# Patient Record
Sex: FEMALE | Race: WHITE | NOT HISPANIC OR LATINO | Employment: FULL TIME | URBAN - METROPOLITAN AREA
[De-identification: names, ages, dates, MRNs, and addresses within clinical notes are randomized per-mention and may not be internally consistent; named-entity substitution may affect disease eponyms.]

---

## 2017-01-12 RX ORDER — TOPIRAMATE 25 MG/1
TABLET ORAL
Qty: 30 TABLET | Refills: 0 | Status: SHIPPED | OUTPATIENT
Start: 2017-01-12 | End: 2017-01-19 | Stop reason: SDUPTHER

## 2017-01-16 ENCOUNTER — TELEPHONE (OUTPATIENT)
Dept: NEUROLOGY | Facility: CLINIC | Age: 27
End: 2017-01-16

## 2017-01-19 ENCOUNTER — OFFICE VISIT (OUTPATIENT)
Dept: NEUROLOGY | Facility: CLINIC | Age: 27
End: 2017-01-19

## 2017-01-19 VITALS
HEIGHT: 68 IN | BODY MASS INDEX: 39.1 KG/M2 | WEIGHT: 258 LBS | SYSTOLIC BLOOD PRESSURE: 115 MMHG | DIASTOLIC BLOOD PRESSURE: 79 MMHG

## 2017-01-19 DIAGNOSIS — R56.9 CONVULSIONS, UNSPECIFIED CONVULSION TYPE (HCC): Primary | ICD-10-CM

## 2017-01-19 PROCEDURE — 99213 OFFICE O/P EST LOW 20 MIN: CPT | Performed by: PSYCHIATRY & NEUROLOGY

## 2017-01-19 RX ORDER — LEVETIRACETAM 1000 MG/1
1500 TABLET ORAL 2 TIMES DAILY
Qty: 90 TABLET | Refills: 11 | Status: SHIPPED | OUTPATIENT
Start: 2017-01-19 | End: 2017-08-11 | Stop reason: SDUPTHER

## 2017-01-19 RX ORDER — TOPIRAMATE 25 MG/1
25 TABLET ORAL NIGHTLY
Qty: 30 TABLET | Refills: 11 | Status: SHIPPED | OUTPATIENT
Start: 2017-01-19 | End: 2017-08-11 | Stop reason: SDUPTHER

## 2017-01-19 NOTE — LETTER
January 19, 2017     Alejandro Cleary MD  [address] [place] [address] [address]  [place] [address]    Patient: Aubrie Hilton   YOB: 1990   Date of Visit: 1/19/2017       Dear Dr. Evin MD:    Thank you for referring Aubrie Hilton to me for evaluation. Below are the relevant portions of my assessment and plan of care.    If you have questions, please do not hesitate to call me. I look forward to following Aubrie along with you.         Sincerely,        Iker Leger MD        CC: No Recipients  Iker Leger MD  1/19/2017 11:04 AM  Signed  Subjective:     Patient ID: Aubrie Hilton is a 26 y.o. female.    History of Present Illness     The patient has had no seizures since her last visit here in July.  She is compliant with the medicine and not having side effects if anything she has less of a fall on since she has been on the Topamax.  The following portions of the patient's history were reviewed and updated as appropriate: allergies, current medications, past family history, past medical history, past social history, past surgical history and problem list.      Current Outpatient Prescriptions:   •  Etonogestrel (IMPLANON) 68 MG implant subdermal implant, Inject 1 each into the skin 1 (One) Time., Disp: , Rfl:   •  FLUoxetine (PROzac) 20 MG capsule, , Disp: , Rfl:   •  levETIRAcetam (KEPPRA) 1000 MG tablet, Take 1.5 tablets by mouth 2 (Two) Times a Day., Disp: 90 tablet, Rfl: 11  •  spironolactone (ALDACTONE) 100 MG tablet, , Disp: , Rfl:   •  topiramate (TOPAMAX) 25 MG tablet, Take 1 tablet by mouth Every Night., Disp: 30 tablet, Rfl: 11    Review of Systems   Constitutional: Negative.    Neurological: Negative.    Psychiatric/Behavioral: Negative.         Objective:    Neurologic Exam  Mental status examination was appropriate.  Funduscopy, visual fields, eye movements and pupillary reflexes were normal.  No facial weakness was noted.  Gait was normal.  No pattern of focal weakness  was noted.  Physical Exam    Assessment/Plan:     Aubrie was seen today for seizures.    Diagnoses and all orders for this visit:    Convulsions, unspecified convulsion type    Other orders  -     levETIRAcetam (KEPPRA) 1000 MG tablet; Take 1.5 tablets by mouth 2 (Two) Times a Day.  -     topiramate (TOPAMAX) 25 MG tablet; Take 1 tablet by mouth Every Night.       Prescription drug management - Topamax and Keppra as above.      Follow-up in the office in one year.FU in one year  Thank you for allowing me to share in the care of this patient.  Iker Leger M.D.

## 2017-01-19 NOTE — PROGRESS NOTES
Subjective:     Patient ID: Aubrie Hilton is a 26 y.o. female.    History of Present Illness     The patient has had no seizures since her last visit here in July.  She is compliant with the medicine and not having side effects if anything she has less of a fall on since she has been on the Topamax.  The following portions of the patient's history were reviewed and updated as appropriate: allergies, current medications, past family history, past medical history, past social history, past surgical history and problem list.      Current Outpatient Prescriptions:   •  Etonogestrel (IMPLANON) 68 MG implant subdermal implant, Inject 1 each into the skin 1 (One) Time., Disp: , Rfl:   •  FLUoxetine (PROzac) 20 MG capsule, , Disp: , Rfl:   •  levETIRAcetam (KEPPRA) 1000 MG tablet, Take 1.5 tablets by mouth 2 (Two) Times a Day., Disp: 90 tablet, Rfl: 11  •  spironolactone (ALDACTONE) 100 MG tablet, , Disp: , Rfl:   •  topiramate (TOPAMAX) 25 MG tablet, Take 1 tablet by mouth Every Night., Disp: 30 tablet, Rfl: 11    Review of Systems   Constitutional: Negative.    Neurological: Negative.    Psychiatric/Behavioral: Negative.         Objective:    Neurologic Exam  Mental status examination was appropriate.  Funduscopy, visual fields, eye movements and pupillary reflexes were normal.  No facial weakness was noted.  Gait was normal.  No pattern of focal weakness was noted.  Physical Exam    Assessment/Plan:     Aubrie was seen today for seizures.    Diagnoses and all orders for this visit:    Convulsions, unspecified convulsion type    Other orders  -     levETIRAcetam (KEPPRA) 1000 MG tablet; Take 1.5 tablets by mouth 2 (Two) Times a Day.  -     topiramate (TOPAMAX) 25 MG tablet; Take 1 tablet by mouth Every Night.       Prescription drug management - Topamax and Keppra as above.      Follow-up in the office in one year.FU in one year  Thank you for allowing me to share in the care of this patient.  Iker Leger  M.D.

## 2017-01-19 NOTE — MR AVS SNAPSHOT
Aubrie ORELLANA Yobani   1/19/2017 10:30 AM   Office Visit    Dept Phone:  [phone]   Encounter #:  [id number]    Provider:  Iker Leger Jr., MD   Department:  [facility] MEDICAL [facility] NEUROLOGY                Your Full Care Plan              Today's Medication Changes          These changes are accurate as of: 1/19/17 10:55 AM.  If you have any questions, ask your nurse or doctor.               Medication(s)that have changed:     topiramate 25 MG tablet   Commonly known as:  TOPAMAX   Take 1 tablet by mouth Every Night.   What changed:  See the new instructions.   Changed by:  Iker Leger Jr., MD            Where to Get Your Medications      These medications were sent to [facility] [place], [place] - [address] [phone] [facility] [phone]   [address]  [place] [address]     Phone:  [phone]     levETIRAcetam 1000 MG tablet    topiramate 25 MG tablet                  Your Updated Medication List          This list is accurate as of: 1/19/17 10:55 AM.  Always use your most recent med list.                FLUoxetine 20 MG capsule   Commonly known as:  PROzac       IMPLANON 68 MG implant subdermal implant   Generic drug:  Etonogestrel       levETIRAcetam 1000 MG tablet   Commonly known as:  KEPPRA   Take 1.5 tablets by mouth 2 (Two) Times a Day.       spironolactone 100 MG tablet   Commonly known as:  ALDACTONE       topiramate 25 MG tablet   Commonly known as:  TOPAMAX   Take 1 tablet by mouth Every Night.               Instructions     None    Patient Instructions History      Upcoming Appointments     Visit Type Date Time Department    FOLLOW UP 1/19/2017 10:30 AM [facility] NEUROLOGY JANET      [facility] Signup     [facility] allows you to send messages to your doctor, view your test results, renew your prescriptions, schedule appointments, and more. To sign up, go to [employer] and click on the Sign Up Now link in the New User?  "box. Enter your [employer] Activation Code exactly as it appears below along with the last four digits of your Social Security Number and your Date of Birth () to complete the sign-up process. If you do not sign up before the expiration date, you must request a new code.    [employer] Activation Code: [email or web address]  Expires: 2017 10:55 AM    If you have questions, you can email [email or web address] or call [phone] to talk to our [employer] staff. Remember, [employer] is NOT to be used for urgent needs. For medical emergencies, dial 911.               Other Info from Your Visit           Your Appointments     2018 11:00 AM EST   Follow Up with Iker Leger Jr., MD   [facility] (--)    [address]  [facility] [id number] [phone]           Arrive 15 minutes prior to appointment.              Allergies     No Known Allergies      Reason for Visit     Seizures           Vital Signs     Blood Pressure Height Weight Body Mass Index Smoking Status       115/79 68\" (172.7 cm) 258 lb (117 kg) 39.23 kg/m2 Never Smoker         "

## 2017-01-19 NOTE — LETTER
January 19, 2017     Alejandro Cleary MD  [address] [place] [address] [address]  [place] [address]    Patient: Aubrie Hilton   YOB: 1990   Date of Visit: 1/19/2017       Dear Dr. Evin MD:    Thank you for referring Aubrie Hilton to me for evaluation. Below are the relevant portions of my assessment and plan of care.    If you have questions, please do not hesitate to call me. I look forward to following Aubrie along with you.         Sincerely,        Iker Leger MD        CC: No Recipients

## 2017-08-11 ENCOUNTER — OFFICE VISIT (OUTPATIENT)
Dept: NEUROLOGY | Facility: CLINIC | Age: 27
End: 2017-08-11

## 2017-08-11 VITALS
DIASTOLIC BLOOD PRESSURE: 60 MMHG | BODY MASS INDEX: 32.89 KG/M2 | SYSTOLIC BLOOD PRESSURE: 130 MMHG | WEIGHT: 217 LBS | HEIGHT: 68 IN

## 2017-08-11 DIAGNOSIS — R56.9 CONVULSIONS, UNSPECIFIED CONVULSION TYPE (HCC): Primary | ICD-10-CM

## 2017-08-11 PROCEDURE — 99213 OFFICE O/P EST LOW 20 MIN: CPT | Performed by: PSYCHIATRY & NEUROLOGY

## 2017-08-11 RX ORDER — TOPIRAMATE 25 MG/1
25 TABLET ORAL 2 TIMES DAILY
Qty: 60 TABLET | Refills: 11 | Status: SHIPPED | OUTPATIENT
Start: 2017-08-11 | End: 2017-11-10 | Stop reason: SDUPTHER

## 2017-08-11 RX ORDER — BUSPIRONE HYDROCHLORIDE 10 MG/1
TABLET ORAL
COMMUNITY
Start: 2017-08-02 | End: 2019-05-10

## 2017-08-11 RX ORDER — LEVETIRACETAM 1000 MG/1
1500 TABLET ORAL 2 TIMES DAILY
Qty: 90 TABLET | Refills: 11 | Status: SHIPPED | OUTPATIENT
Start: 2017-08-11 | End: 2017-11-10 | Stop reason: SDUPTHER

## 2017-08-11 NOTE — PROGRESS NOTES
Subjective:     Patient ID: Aubrie Hilton is a 26 y.o. female.    History of Present Illness     Patient had a breakthrough seizure yesterday went to the emergency room.  She was given Ativan.  She is tolerating her medicines well.  Topamax helped at first but she is on a low dose.  The following portions of the patient's history were reviewed and updated as appropriate: allergies, current medications, past family history, past medical history, past social history, past surgical history and problem list.      Current Outpatient Prescriptions:   •  busPIRone (BUSPAR) 10 MG tablet, , Disp: , Rfl:   •  Etonogestrel (IMPLANON) 68 MG implant subdermal implant, Inject 1 each into the skin 1 (One) Time., Disp: , Rfl:   •  FLUoxetine (PROzac) 20 MG capsule, , Disp: , Rfl:   •  levETIRAcetam (KEPPRA) 1000 MG tablet, Take 1.5 tablets by mouth 2 (Two) Times a Day., Disp: 90 tablet, Rfl: 11  •  spironolactone (ALDACTONE) 100 MG tablet, , Disp: , Rfl:   •  topiramate (TOPAMAX) 25 MG tablet, Take 1 tablet by mouth 2 (Two) Times a Day., Disp: 60 tablet, Rfl: 11    Review of Systems   Constitutional: Negative.    Neurological: Positive for dizziness, seizures, light-headedness, numbness and headaches. Negative for tremors, syncope, facial asymmetry, speech difficulty and weakness.   Psychiatric/Behavioral: Positive for agitation, confusion and dysphoric mood. Negative for behavioral problems, decreased concentration, hallucinations, self-injury, sleep disturbance and suicidal ideas. The patient is nervous/anxious. The patient is not hyperactive.         Objective:    Neurologic Exam  Mental status examination was appropriate.  Funduscopy, visual fields, eye movements and pupillary reflexes were normal.  No facial weakness was noted.  Gait was normal.  No pattern of focal weakness was noted.  Physical Exam    Assessment/Plan:     Aubrie was seen today for seizures.    Diagnoses and all orders for this visit:    Convulsions,  unspecified convulsion type    Other orders  -     levETIRAcetam (KEPPRA) 1000 MG tablet; Take 1.5 tablets by mouth 2 (Two) Times a Day.  -     topiramate (TOPAMAX) 25 MG tablet; Take 1 tablet by mouth 2 (Two) Times a Day.       Prescription drug management - increase topiramate to 25 mg twice daily.  No change with Keppra.    Follow up in the office in 3 months.Thank you for allowing me to share in the care of this patient.  Iker Leger M.D.

## 2017-11-10 ENCOUNTER — OFFICE VISIT (OUTPATIENT)
Dept: NEUROLOGY | Facility: CLINIC | Age: 27
End: 2017-11-10

## 2017-11-10 VITALS
BODY MASS INDEX: 33.34 KG/M2 | SYSTOLIC BLOOD PRESSURE: 128 MMHG | HEIGHT: 68 IN | WEIGHT: 220 LBS | DIASTOLIC BLOOD PRESSURE: 80 MMHG

## 2017-11-10 DIAGNOSIS — R56.9 CONVULSIONS, UNSPECIFIED CONVULSION TYPE (HCC): Primary | ICD-10-CM

## 2017-11-10 PROCEDURE — 99212 OFFICE O/P EST SF 10 MIN: CPT | Performed by: PSYCHIATRY & NEUROLOGY

## 2017-11-10 RX ORDER — TOPIRAMATE 25 MG/1
25 TABLET ORAL 2 TIMES DAILY
Qty: 60 TABLET | Refills: 11 | Status: SHIPPED | OUTPATIENT
Start: 2017-11-10 | End: 2018-05-11 | Stop reason: SDUPTHER

## 2017-11-10 RX ORDER — LEVETIRACETAM 1000 MG/1
1500 TABLET ORAL 2 TIMES DAILY
Qty: 90 TABLET | Refills: 11 | Status: SHIPPED | OUTPATIENT
Start: 2017-11-10 | End: 2018-05-11 | Stop reason: SDUPTHER

## 2017-11-10 NOTE — PROGRESS NOTES
Subjective:     Patient ID: Aubrie Hilton is a 27 y.o. female.    History of Present Illness   The patient has had no seizures since her last visit in August.  Medicines were reviewed.  No new problems.  She is not eating well.  The following portions of the patient's history were reviewed and updated as appropriate: allergies, current medications, past family history, past medical history, past social history, past surgical history and problem list.      Current Outpatient Prescriptions:   •  busPIRone (BUSPAR) 10 MG tablet, , Disp: , Rfl:   •  Etonogestrel (IMPLANON) 68 MG implant subdermal implant, Inject 1 each into the skin 1 (One) Time., Disp: , Rfl:   •  FLUoxetine (PROzac) 20 MG capsule, , Disp: , Rfl:   •  levETIRAcetam (KEPPRA) 1000 MG tablet, Take 1.5 tablets by mouth 2 (Two) Times a Day., Disp: 90 tablet, Rfl: 11  •  spironolactone (ALDACTONE) 100 MG tablet, , Disp: , Rfl:   •  topiramate (TOPAMAX) 25 MG tablet, Take 1 tablet by mouth 2 (Two) Times a Day., Disp: 60 tablet, Rfl: 11    Review of Systems   Constitutional: Negative.    Neurological: Negative.    Psychiatric/Behavioral: Negative.         Objective:    Neurologic Exam  Mental status examination was appropriate.  Funduscopy, visual fields, eye movements and pupillary reflexes were normal.  No facial weakness was noted.  Gait was normal.  No pattern of focal weakness was noted.  Physical Exam    Assessment/Plan:     Aubrie was seen today for seizures.    Diagnoses and all orders for this visit:    Convulsions, unspecified convulsion type    Other orders  -     levETIRAcetam (KEPPRA) 1000 MG tablet; Take 1.5 tablets by mouth 2 (Two) Times a Day.  -     topiramate (TOPAMAX) 25 MG tablet; Take 1 tablet by mouth 2 (Two) Times a Day.       Prescription drug management - meds as above    Follow-up in the office in 6 months. Thank you for allowing me to share in the care of this patient.  Iker Leger M.D.

## 2018-05-11 ENCOUNTER — TELEPHONE (OUTPATIENT)
Dept: NEUROLOGY | Facility: CLINIC | Age: 28
End: 2018-05-11

## 2018-05-11 ENCOUNTER — OFFICE VISIT (OUTPATIENT)
Dept: NEUROLOGY | Facility: CLINIC | Age: 28
End: 2018-05-11

## 2018-05-11 VITALS
WEIGHT: 230 LBS | DIASTOLIC BLOOD PRESSURE: 70 MMHG | BODY MASS INDEX: 34.86 KG/M2 | SYSTOLIC BLOOD PRESSURE: 120 MMHG | HEIGHT: 68 IN

## 2018-05-11 DIAGNOSIS — R56.9 CONVULSIONS, UNSPECIFIED CONVULSION TYPE (HCC): Primary | ICD-10-CM

## 2018-05-11 PROCEDURE — 99212 OFFICE O/P EST SF 10 MIN: CPT | Performed by: PSYCHIATRY & NEUROLOGY

## 2018-05-11 RX ORDER — LEVETIRACETAM 1000 MG/1
1500 TABLET ORAL EVERY 12 HOURS SCHEDULED
Qty: 90 TABLET | Refills: 11 | Status: SHIPPED | OUTPATIENT
Start: 2018-05-11 | End: 2019-05-10 | Stop reason: SDUPTHER

## 2018-05-11 RX ORDER — TOPIRAMATE 25 MG/1
25 TABLET ORAL 2 TIMES DAILY
Qty: 60 TABLET | Refills: 11 | Status: SHIPPED | OUTPATIENT
Start: 2018-05-11 | End: 2019-05-10 | Stop reason: SDUPTHER

## 2018-05-11 NOTE — PROGRESS NOTES
Subjective:     Patient ID: Aubrie Hilton is a 27 y.o. female.    History of Present Illness     No definite seizures over the past year.  She had an anxiety attack in February.  Compliant with her medication.  No new problems.  The following portions of the patient's history were reviewed and updated as appropriate: allergies, current medications, past family history, past medical history, past social history, past surgical history and problem list.      Current Outpatient Prescriptions:   •  busPIRone (BUSPAR) 10 MG tablet, , Disp: , Rfl:   •  Etonogestrel (IMPLANON) 68 MG implant subdermal implant, Inject 1 each into the skin 1 (One) Time., Disp: , Rfl:   •  levETIRAcetam (KEPPRA) 1000 MG tablet, Take 1.5 tablets by mouth Every 12 (Twelve) Hours., Disp: 90 tablet, Rfl: 11  •  spironolactone (ALDACTONE) 100 MG tablet, , Disp: , Rfl:   •  topiramate (TOPAMAX) 25 MG tablet, Take 1 tablet by mouth 2 (Two) Times a Day., Disp: 60 tablet, Rfl: 11    Review of Systems   Constitutional: Negative.    Neurological: Positive for headaches. Negative for dizziness, tremors, seizures, syncope, facial asymmetry, speech difficulty, weakness, light-headedness and numbness.   Psychiatric/Behavioral: Negative for agitation, behavioral problems, confusion, decreased concentration, dysphoric mood, hallucinations, self-injury, sleep disturbance and suicidal ideas. The patient is nervous/anxious. The patient is not hyperactive.         Objective:    Neurologic Exam  Mental status examination was appropriate.  Funduscopy, visual fields, eye movements and pupillary reflexes were normal.  No facial weakness was noted.  Gait was normal.  No pattern of focal weakness was noted.  Physical Exam    Assessment/Plan:     Aubrie was seen today for seizures.    Diagnoses and all orders for this visit:    Convulsions, unspecified convulsion type    Other orders  -     topiramate (TOPAMAX) 25 MG tablet; Take 1 tablet by mouth 2 (Two) Times a  Day.  -     levETIRAcetam (KEPPRA) 1000 MG tablet; Take 1.5 tablets by mouth Every 12 (Twelve) Hours.         Prescription drug management - meds as above    Follow-up in the office in one year. Thank you for allowing me to share in the care of this patient.  Iker Leger M.D.

## 2018-05-11 NOTE — TELEPHONE ENCOUNTER
----- Message from Valentina Gunderson sent at 5/11/2018  2:28 PM EDT -----  Pt is requesting a note be sent to ARNULFO Espinoza that it is okay for her to prescribe phentermine.         Fax     #[phone]

## 2019-05-10 ENCOUNTER — OFFICE VISIT (OUTPATIENT)
Dept: NEUROLOGY | Facility: CLINIC | Age: 29
End: 2019-05-10

## 2019-05-10 VITALS
WEIGHT: 210 LBS | DIASTOLIC BLOOD PRESSURE: 80 MMHG | HEIGHT: 68 IN | BODY MASS INDEX: 31.83 KG/M2 | SYSTOLIC BLOOD PRESSURE: 120 MMHG

## 2019-05-10 DIAGNOSIS — G40.A09 ABSENCE ATTACK (HCC): Primary | ICD-10-CM

## 2019-05-10 PROCEDURE — 99213 OFFICE O/P EST LOW 20 MIN: CPT | Performed by: PSYCHIATRY & NEUROLOGY

## 2019-05-10 RX ORDER — LEVETIRACETAM 1000 MG/1
1500 TABLET ORAL EVERY 12 HOURS SCHEDULED
Qty: 90 TABLET | Refills: 11 | Status: SHIPPED | OUTPATIENT
Start: 2019-05-10 | End: 2019-05-10 | Stop reason: SDUPTHER

## 2019-05-10 RX ORDER — LEVETIRACETAM 1000 MG/1
1500 TABLET ORAL EVERY 12 HOURS SCHEDULED
Qty: 90 TABLET | Refills: 11 | Status: SHIPPED | OUTPATIENT
Start: 2019-05-10 | End: 2019-11-08 | Stop reason: SDUPTHER

## 2019-05-10 RX ORDER — TOPIRAMATE 50 MG/1
50 TABLET, FILM COATED ORAL 2 TIMES DAILY
Qty: 60 TABLET | Refills: 11 | Status: SHIPPED | OUTPATIENT
Start: 2019-05-10 | End: 2019-11-08 | Stop reason: SDUPTHER

## 2019-05-10 NOTE — PROGRESS NOTES
Subjective:     Patient ID: Aubrie Hilton is a 28 y.o. female.    History of Present Illness    Continues to have some sort of episode.  Some of it sounds like anxiety attack.  She is not 100% sure in the history is difficult.  Taken from the patient's friend.  Tolerating medicine well.  Has very poor insurances and is worried about getting further testing.  The following portions of the patient's history were reviewed and updated as appropriate: allergies, current medications, past family history, past medical history, past social history, past surgical history and problem list.      Current Outpatient Medications:   •  Etonogestrel (IMPLANON) 68 MG implant subdermal implant, Inject 1 each into the skin 1 (One) Time., Disp: , Rfl:   •  levETIRAcetam (KEPPRA) 1000 MG tablet, Take 1.5 tablets by mouth Every 12 (Twelve) Hours., Disp: 90 tablet, Rfl: 11  •  spironolactone (ALDACTONE) 100 MG tablet, , Disp: , Rfl:   •  topiramate (TOPAMAX) 50 MG tablet, Take 1 tablet by mouth 2 (Two) Times a Day., Disp: 60 tablet, Rfl: 11    Review of Systems   Constitutional: Negative.    Neurological: Positive for seizures, light-headedness and headaches. Negative for dizziness, tremors, syncope, facial asymmetry, speech difficulty, weakness and numbness.   Psychiatric/Behavioral: Negative.           I have reviewed ROS completed by medical assistant.     Objective:    Neurologic Exam  Mental status examination was appropriate.  Funduscopy, visual fields, eye movements and pupillary reflexes were normal.  No facial weakness was noted.  Gait was normal.  No pattern of focal weakness was noted.  Physical Exam    Assessment/Plan:     Aubrie was seen today for seizures.    Diagnoses and all orders for this visit:    Absence attack (CMS/[facility])    Other orders  -     Discontinue: levETIRAcetam (KEPPRA) 1000 MG tablet; Take 1.5 tablets by mouth Every 12 (Twelve) Hours.  -     levETIRAcetam (KEPPRA) 1000 MG tablet; Take 1.5 tablets by mouth  Every 12 (Twelve) Hours.  -     topiramate (TOPAMAX) 50 MG tablet; Take 1 tablet by mouth 2 (Two) Times a Day.       Increased topiramate to 50 mg twice daily.  Keppra no change.  Prescription drug management - meds as above    Follow-up in the office in 6 months. Thank you for allowing me to share in the care of this patient.  Iker Leger M.D.

## 2019-07-01 ENCOUNTER — TELEPHONE (OUTPATIENT)
Dept: NEUROLOGY | Facility: CLINIC | Age: 29
End: 2019-07-01

## 2019-07-01 NOTE — TELEPHONE ENCOUNTER
----- Message from Melanie Markham sent at 7/1/2019  9:14 AM EDT -----  Contact: [phone]  PT CALLING BECAUSE SHE HAD ANOTHER SEIZURE AND PT IS READY TO GO AHEAD AND GET EEG DONE. AWA HAD TALKED TO PT ABOUT DOING AN EEG EARLIER BUT PT WANTED TO TRY OTHER METHODS DUE TO COST OF EEG. PT WOULD LIKE TO KNOW IF WE COULD GET THAT ORDER PUT IN AND GET THE EEG SCHEDULED. PLEASE ADVISE AND CALL PT [phone]

## 2019-07-02 DIAGNOSIS — R56.9 GENERALIZED CONVULSIVE SEIZURES (HCC): Primary | ICD-10-CM

## 2019-07-19 ENCOUNTER — HOSPITAL ENCOUNTER (OUTPATIENT)
Dept: NEUROLOGY | Facility: HOSPITAL | Age: 29
Discharge: HOME | End: 2019-07-19
Admitting: PSYCHIATRY & NEUROLOGY

## 2019-07-19 DIAGNOSIS — R56.9 GENERALIZED CONVULSIVE SEIZURES (HCC): ICD-10-CM

## 2019-07-19 PROCEDURE — 95819 EEG AWAKE AND ASLEEP: CPT | Performed by: PSYCHIATRY & NEUROLOGY

## 2019-07-19 PROCEDURE — 95816 EEG AWAKE AND DROWSY: CPT

## 2019-07-29 ENCOUNTER — TELEPHONE (OUTPATIENT)
Dept: NEUROLOGY | Facility: CLINIC | Age: 29
End: 2019-07-29

## 2019-07-29 NOTE — TELEPHONE ENCOUNTER
S/w patient gave results she stated that she hasn't had any blackout episodes since the two she had before the EEG but is having headaches. I told that if she has any episode to give us a call.

## 2019-07-29 NOTE — TELEPHONE ENCOUNTER
----- Message from Melanie Markham sent at 7/29/2019 11:03 AM EDT -----  Contact: [phone]  PT CALLING TO SEE IF THE RESULTS OF HER EEG ARE IN. PLEASE ADVISE AND CALL PT [phone]

## 2019-11-08 ENCOUNTER — OFFICE VISIT (OUTPATIENT)
Dept: NEUROLOGY | Facility: CLINIC | Age: 29
End: 2019-11-08

## 2019-11-08 VITALS — WEIGHT: 205 LBS | HEIGHT: 68 IN | BODY MASS INDEX: 31.07 KG/M2

## 2019-11-08 DIAGNOSIS — R56.9 CONVULSIONS, UNSPECIFIED CONVULSION TYPE (HCC): ICD-10-CM

## 2019-11-08 DIAGNOSIS — G40.A09 ABSENCE ATTACK (HCC): Primary | ICD-10-CM

## 2019-11-08 PROCEDURE — 99213 OFFICE O/P EST LOW 20 MIN: CPT | Performed by: PSYCHIATRY & NEUROLOGY

## 2019-11-08 RX ORDER — LAMOTRIGINE 100 MG/1
100 TABLET ORAL DAILY
Qty: 30 TABLET | Refills: 5 | Status: SHIPPED | OUTPATIENT
Start: 2019-11-08 | End: 2019-12-06 | Stop reason: SDUPTHER

## 2019-11-08 RX ORDER — LEVETIRACETAM 1000 MG/1
1500 TABLET ORAL EVERY 12 HOURS SCHEDULED
Qty: 90 TABLET | Refills: 11 | Status: SHIPPED | OUTPATIENT
Start: 2019-11-08 | End: 2019-12-06 | Stop reason: SDUPTHER

## 2019-11-08 RX ORDER — TOPIRAMATE 50 MG/1
50 TABLET, FILM COATED ORAL 2 TIMES DAILY
Qty: 60 TABLET | Refills: 11 | Status: SHIPPED | OUTPATIENT
Start: 2019-11-08 | End: 2019-12-06 | Stop reason: SDUPTHER

## 2019-11-08 NOTE — PROGRESS NOTES
Subjective:     Patient ID: Aubrie Hilton is a 29 y.o. female.    History of Present Illness    The patient's seizures have become more frequent.  He also had a generalized tonic-clonic seizure couple weeks ago.  She is on Keppra 1500 mg twice daily and Topamax 50 mg twice daily.  She has had 2 EEGs done that showed generalized spike-wave burst without clinical accompaniment.  No myoclonus.  She confessed to me that was she was in her teen years she never took rectal appropriately.  The following portions of the patient's history were reviewed and updated as appropriate: allergies, current medications, past family history, past medical history, past social history, past surgical history and problem list.      Current Outpatient Medications:   •  Etonogestrel (IMPLANON) 68 MG implant subdermal implant, Inject 1 each into the skin 1 (One) Time., Disp: , Rfl:   •  levETIRAcetam (KEPPRA) 1000 MG tablet, Take 1.5 tablets by mouth Every 12 (Twelve) Hours., Disp: 90 tablet, Rfl: 11  •  spironolactone (ALDACTONE) 100 MG tablet, , Disp: , Rfl:   •  topiramate (TOPAMAX) 50 MG tablet, Take 1 tablet by mouth 2 (Two) Times a Day., Disp: 60 tablet, Rfl: 11  •  lamoTRIgine (LAMICTAL) 100 MG tablet, Take 1 tablet by mouth Daily., Disp: 30 tablet, Rfl: 5    Review of Systems   Allergic/Immunologic: Negative for environmental allergies, food allergies and immunocompromised state.   Neurological: Positive for seizures (nov 3 2019, and 2 weeks before). Negative for dizziness, tremors, syncope, facial asymmetry, speech difficulty, weakness, light-headedness, numbness and headaches.   Hematological: Negative for adenopathy. Does not bruise/bleed easily.   Psychiatric/Behavioral: Negative for confusion and sleep disturbance. The patient is not nervous/anxious.           I have reviewed ROS completed by medical assistant.     Objective:    Neurologic Exam  Mental status examination was appropriate.  Funduscopy, visual fields, eye  movements and pupillary reflexes were normal.  No facial weakness was noted.  Gait was normal.  No pattern of focal weakness was noted.  Physical Exam    Assessment/Plan:     Aubrie was seen today for seizures.    Diagnoses and all orders for this visit:    Absence attack (CMS/HCC)    Convulsions, unspecified convulsion type (CMS/HCC)    Other orders  -     lamoTRIgine (LAMICTAL) 100 MG tablet; Take 1 tablet by mouth Daily.  -     levETIRAcetam (KEPPRA) 1000 MG tablet; Take 1.5 tablets by mouth Every 12 (Twelve) Hours.  -     topiramate (TOPAMAX) 50 MG tablet; Take 1 tablet by mouth 2 (Two) Times a Day.       We will add low-dose Lamictal.  Follow-up in the office in 1 month.  Other medicines the same.  Thank you for allowing me to share in the care of this patient.  Iker Leger M.D.

## 2019-12-06 ENCOUNTER — OFFICE VISIT (OUTPATIENT)
Dept: NEUROLOGY | Facility: CLINIC | Age: 29
End: 2019-12-06

## 2019-12-06 VITALS — HEIGHT: 68 IN | BODY MASS INDEX: 31.07 KG/M2 | WEIGHT: 205 LBS

## 2019-12-06 DIAGNOSIS — G40.A09 ABSENCE ATTACK (HCC): Primary | ICD-10-CM

## 2019-12-06 DIAGNOSIS — R56.9 CONVULSIONS, UNSPECIFIED CONVULSION TYPE (HCC): ICD-10-CM

## 2019-12-06 PROCEDURE — 99212 OFFICE O/P EST SF 10 MIN: CPT | Performed by: PSYCHIATRY & NEUROLOGY

## 2019-12-06 RX ORDER — TOPIRAMATE 50 MG/1
50 TABLET, FILM COATED ORAL 2 TIMES DAILY
Qty: 60 TABLET | Refills: 11 | Status: SHIPPED | OUTPATIENT
Start: 2019-12-06 | End: 2020-05-27 | Stop reason: SDUPTHER

## 2019-12-06 RX ORDER — LEVETIRACETAM 1000 MG/1
1000 TABLET ORAL EVERY 12 HOURS SCHEDULED
Qty: 60 TABLET | Refills: 11 | Status: SHIPPED | OUTPATIENT
Start: 2019-12-06 | End: 2020-05-27 | Stop reason: SDUPTHER

## 2019-12-06 RX ORDER — LAMOTRIGINE 100 MG/1
100 TABLET ORAL DAILY
Qty: 60 TABLET | Refills: 11 | Status: SHIPPED | OUTPATIENT
Start: 2019-12-06 | End: 2019-12-09 | Stop reason: SDUPTHER

## 2019-12-06 NOTE — PROGRESS NOTES
Subjective:     Patient ID: Aubrie Hilton is a 29 y.o. female.    History of Present Illness    The patient was seen back in the office for follow-up of seizures.  She has had no seizures since her last visit.  At her last visit a month ago we added Lamictal she is currently on 100 mg.  She is still on Keppra 1500 mg twice daily and Topamax 50 mg twice daily.  No definite side effects but she does feel better.  The following portions of the patient's history were reviewed and updated as appropriate: allergies, current medications, past family history, past medical history, past social history, past surgical history and problem list.      Current Outpatient Medications:   •  Etonogestrel (IMPLANON) 68 MG implant subdermal implant, Inject 1 each into the skin 1 (One) Time., Disp: , Rfl:   •  lamoTRIgine (LAMICTAL) 100 MG tablet, Take 1 tablet by mouth Daily., Disp: 60 tablet, Rfl: 11  •  levETIRAcetam (KEPPRA) 1000 MG tablet, Take 1 tablet by mouth Every 12 (Twelve) Hours., Disp: 60 tablet, Rfl: 11  •  spironolactone (ALDACTONE) 100 MG tablet, , Disp: , Rfl:   •  topiramate (TOPAMAX) 50 MG tablet, Take 1 tablet by mouth 2 (Two) Times a Day., Disp: 60 tablet, Rfl: 11    Review of Systems   Constitutional: Negative for chills, fatigue and fever.   HENT: Negative for hearing loss, tinnitus and trouble swallowing.    Eyes: Negative for pain, redness and itching.   Respiratory: Negative for cough, shortness of breath and wheezing.    Cardiovascular: Negative for chest pain, palpitations and leg swelling.   Gastrointestinal: Negative for diarrhea, nausea and vomiting.   Endocrine: Negative for cold intolerance, heat intolerance and polydipsia.   Genitourinary: Negative for decreased urine volume, difficulty urinating and urgency.   Musculoskeletal: Negative for back pain, neck pain and neck stiffness.   Skin: Negative for color change, rash and wound.   Allergic/Immunologic: Negative for environmental allergies, food  allergies and immunocompromised state.   Neurological: Negative for dizziness, tremors, seizures, syncope, facial asymmetry, speech difficulty, weakness, light-headedness, numbness and headaches.   Hematological: Negative for adenopathy. Does not bruise/bleed easily.   Psychiatric/Behavioral: Negative for confusion and sleep disturbance. The patient is not nervous/anxious.           I have reviewed ROS completed by medical assistant.     Objective:    Neurologic Exam    Physical Exam    Assessment/Plan:     Aubrie was seen today for seizures.    Diagnoses and all orders for this visit:    Absence attack (CMS/HCC)    Convulsions, unspecified convulsion type (CMS/HCC)    Other orders  -     levETIRAcetam (KEPPRA) 1000 MG tablet; Take 1 tablet by mouth Every 12 (Twelve) Hours.  -     lamoTRIgine (LAMICTAL) 100 MG tablet; Take 1 tablet by mouth Daily.  -     topiramate (TOPAMAX) 50 MG tablet; Take 1 tablet by mouth 2 (Two) Times a Day.         Increased lamotrigine to 100 mg twice daily and reduce Keppra to thousand milligrams twice daily.  Topamax no change.  She thinks she is on 25 mg Topamax pills, she will check.  Prescription drug management - meds as above    Follow-up in the office in 6 months. Thank you for allowing me to share in the care of this patient.  Iker Leger M.D.

## 2019-12-09 ENCOUNTER — TELEPHONE (OUTPATIENT)
Dept: NEUROLOGY | Facility: CLINIC | Age: 29
End: 2019-12-09

## 2019-12-09 RX ORDER — LAMOTRIGINE 100 MG/1
100 TABLET ORAL DAILY
Qty: 90 TABLET | Refills: 3 | Status: SHIPPED | OUTPATIENT
Start: 2019-12-09 | End: 2019-12-12 | Stop reason: SDUPTHER

## 2019-12-12 ENCOUNTER — TELEPHONE (OUTPATIENT)
Dept: NEUROLOGY | Facility: CLINIC | Age: 29
End: 2019-12-12

## 2019-12-12 RX ORDER — LAMOTRIGINE 100 MG/1
100 TABLET ORAL 2 TIMES DAILY
Qty: 180 TABLET | Refills: 3 | Status: SHIPPED | OUTPATIENT
Start: 2019-12-12 | End: 2020-05-27 | Stop reason: SDUPTHER

## 2020-05-19 ENCOUNTER — TELEPHONE (OUTPATIENT)
Dept: NEUROLOGY | Facility: CLINIC | Age: 30
End: 2020-05-19

## 2020-05-19 NOTE — TELEPHONE ENCOUNTER
Provider: DR. PALUMBO  Caller: BROOKS MORRIS  Relationship to Patient: SELF  Phone Number: [phone]      Reason for Call: PT CALLING DUE TO HAD A REAL BAD SEIZURE ON THE 5/17/2020 2:45PM, SHE WAS REALLY STRESSED AND SHE HAD FELL HIT HER HEAD AND LOSS CONTROL OF HER BLADDER, SHE DID GO TO THE ER TO HAVE A CT OF HER HEAD AND IT WAS OK. SHE JUST WANTED TO LET THE DOCTOR KNOW.  SHE ALSO HAS A APPT ON June 5TH AND IT HAS A PENCIL IN THE FIRST BLOCK AND IT SAYS TO RESCHEDULE, IS THIS CORRECT?

## 2020-05-27 ENCOUNTER — OFFICE VISIT (OUTPATIENT)
Dept: NEUROLOGY | Facility: CLINIC | Age: 30
End: 2020-05-27

## 2020-05-27 DIAGNOSIS — R56.9 CONVULSIONS, UNSPECIFIED CONVULSION TYPE (HCC): Primary | ICD-10-CM

## 2020-05-27 PROCEDURE — 99442 PR PHYS/QHP TELEPHONE EVALUATION 11-20 MIN: CPT | Performed by: PSYCHIATRY & NEUROLOGY

## 2020-05-27 RX ORDER — LAMOTRIGINE 200 MG/1
200 TABLET ORAL 2 TIMES DAILY
Qty: 60 TABLET | Refills: 11 | Status: SHIPPED | OUTPATIENT
Start: 2020-05-27 | End: 2020-05-28

## 2020-05-27 RX ORDER — LEVETIRACETAM 500 MG/1
TABLET ORAL
Qty: 180 TABLET | Refills: 3 | Status: SHIPPED | OUTPATIENT
Start: 2020-05-27 | End: 2021-10-08

## 2020-05-27 RX ORDER — SPIRONOLACTONE 50 MG/1
TABLET, FILM COATED ORAL
COMMUNITY
Start: 2020-03-18 | End: 2022-08-22

## 2020-05-27 RX ORDER — TOPIRAMATE 50 MG/1
50 TABLET, FILM COATED ORAL 2 TIMES DAILY
Qty: 60 TABLET | Refills: 11 | Status: SHIPPED | OUTPATIENT
Start: 2020-05-27 | End: 2020-10-30

## 2020-05-27 RX ORDER — LEVETIRACETAM 500 MG/1
500 TABLET ORAL EVERY 12 HOURS SCHEDULED
Qty: 60 TABLET | Refills: 11 | Status: SHIPPED | OUTPATIENT
Start: 2020-05-27 | End: 2020-05-27

## 2020-05-27 NOTE — PROGRESS NOTES
Phone visit.  Last seen in December.  Follow-up of seizures.  The patient missed medicine recently had been drinking and has severe seizure and hit her head.  She was taken emergency room at [facility] and given extra Keppra IV.  CAT scan of the head was unremarkable.           Aubrie was seen today for seizures.    Diagnoses and all orders for this visit:    Convulsions, unspecified convulsion type (CMS/HCC)    Other orders  -     lamoTRIgine (LaMICtal) 200 MG tablet; Take 1 tablet by mouth 2 (Two) Times a Day.  -     levETIRAcetam (KEPPRA) 500 MG tablet; Take 1 tablet by mouth Every 12 (Twelve) Hours.  -     topiramate (TOPAMAX) 50 MG tablet; Take 1 tablet by mouth 2 (Two) Times a Day.    We are trying to wean Keppra and switch her over to Lamictal.  She is having trouble with depression.  Increased lamotrigine to 200 mg twice daily.  Reduced Keppra to 500 mg twice daily.  Prescription drug management-meds as above  Follow-up in the office in 2 months.  Thank you for allowing me to share in the care of your patient  Iker Leger MD      You have chosen to receive care through a telephone visit. Do you consent to use a telephone visit for your medical care today? Yes  This visit has been rescheduled as a phone visit to comply with patient safety concerns in accordance with CDC recommendations. Total time of discussion was 20 minutes.

## 2020-05-28 RX ORDER — LAMOTRIGINE 200 MG/1
TABLET ORAL
Qty: 180 TABLET | Refills: 3 | Status: SHIPPED | OUTPATIENT
Start: 2020-05-28 | End: 2021-06-11 | Stop reason: SDUPTHER

## 2020-08-27 ENCOUNTER — TELEPHONE (OUTPATIENT)
Dept: NEUROLOGY | Facility: CLINIC | Age: 30
End: 2020-08-27

## 2020-08-27 NOTE — TELEPHONE ENCOUNTER
Received a call from patient who stated that she had recently had some new seizures and isn't sure if her medication needs to be updated or exactly what is wrong but she needs to try to get it under control. I have scheduled an appt with Dr Vidales at [facility] but it is not until 12/14.     She had one seizure about 3 weeks ago that was a small seizure and then again yesterday she had a bad one. Her 6 old daughter was with her and she passed out. Her daughter does know what to do and she told her she was about to Black out.     Can someone please contact patient and discuss?     Aubrie Hilton   [phone]

## 2020-10-30 ENCOUNTER — OFFICE VISIT (OUTPATIENT)
Dept: NEUROLOGY | Facility: CLINIC | Age: 30
End: 2020-10-30

## 2020-10-30 VITALS
SYSTOLIC BLOOD PRESSURE: 132 MMHG | HEIGHT: 68 IN | HEART RATE: 94 BPM | BODY MASS INDEX: 35.46 KG/M2 | DIASTOLIC BLOOD PRESSURE: 88 MMHG | OXYGEN SATURATION: 98 % | WEIGHT: 234 LBS

## 2020-10-30 DIAGNOSIS — G40.309 GENERALIZED EPILEPSY (HCC): ICD-10-CM

## 2020-10-30 DIAGNOSIS — Z79.899 HIGH RISK MEDICATION USE: Primary | ICD-10-CM

## 2020-10-30 PROCEDURE — 99215 OFFICE O/P EST HI 40 MIN: CPT | Performed by: PSYCHIATRY & NEUROLOGY

## 2020-10-30 RX ORDER — TOPIRAMATE 50 MG/1
TABLET, FILM COATED ORAL
Qty: 120 TABLET | Refills: 11 | Status: SHIPPED | OUTPATIENT
Start: 2020-10-30 | End: 2020-11-02

## 2020-10-30 RX ORDER — MIRTAZAPINE 15 MG/1
TABLET, FILM COATED ORAL
COMMUNITY
Start: 2020-10-21 | End: 2020-10-30 | Stop reason: SINTOL

## 2020-10-30 RX ORDER — CITALOPRAM 40 MG/1
40 TABLET ORAL DAILY
COMMUNITY
Start: 2020-08-25 | End: 2021-02-05 | Stop reason: HOSPADM

## 2020-10-30 RX ORDER — FOLIC ACID 1 MG/1
1 TABLET ORAL DAILY
Qty: 30 TABLET | Refills: 11 | Status: SHIPPED | OUTPATIENT
Start: 2020-10-30 | End: 2020-11-02

## 2020-10-30 NOTE — PATIENT INSTRUCTIONS
[facility]  Eileen Vidales MD  Neurology clinic  [phone]    With anti-seizure medications, you may initially notice side effects of fatigue, drowsiness, unsteadiness, and dizziness.  Other possible side effects include nausea, abdominal pain, headache, blurry or double vision, slurred speech and mood changes.  Generally, patients will noticed these symptoms when the medication is first started or with higher doses and will go away with time.    It is import to consistently take your medication every day.  Missing just one dose may put you at risk for a breakthrough seizure.  Consider using reminders on your phone or a pill box.    If you develop a rash, please call the neurology clinic immediately or notify another healthcare professional, as this may be potentially life-threatening.  If you are unable to reach a healthcare professional, go to the emergency room immediately for further evaluation.    If you develop thoughts of wanting to hurt yourself or others, please call the neurology clinic immediately to notify another healthcare professional.  If you are unable to reach a healthcare professional, go to the emergency room immediately for further evaluation.    Taking anti-seizure medications may increase the risk of birth defects.  If you are a female of child-bearing potential, it is recommended that you take folic acid (1-4 mg) daily.  This may reduce the risk of birth defects while pregnant and taking seizure medication.  If you become pregnant, contact our office immediately. You will need to be followed very closely (at least monthly appointments).  I also recommend contacting The North American Antiepileptic Drug Pregnancy Registry at www.aedpregnancyregistry.org or 1-495.889.9265.    It is the [place] state law that you cannot drive within 90 days of a seizure.    You should avoid certain activities that if you were to have a seizure, you could harm yourself or others. In  general, it is recommended that you avoid operating heavy machinery or power tools, swimming or taking baths by yourself (showers are ok), don't stand over open flames, don't get on high ladders or the roof.  I also recommend to avoid sleeping on your stomach.    For further information on epilepsy and resources available to patients and their families, please visit the Epilepsy Foundation [facility] at www.[email or web address] or call [phone].    **Check out the Epilepsy Foundation [facility]'s monthly Art Group Gathering.  They are located at [facility] [place], [address] [place] [address].  Call Ivis Boswell at [phone] or email her at [email or web address] for the dates of future gatherings.**      **If you have having memory problems, consider HOBSCOTCH (Home-Based Self-management and Cognitive Training Changes lives).  It is an 8 week self-management program for adults with epilepsy and memory problems.  The program is free at the Epilepsy [facility].  Contact Adali Martell at [phone] or [email or web address].**    Check out My Epilepsy Story ([email or web address]).  Their goal is to foster the growth of young girls and women affected by epilepsy and encourage them not just to lives, but to THRIVE!  You can get involved by donating, sharing your story, or becoming an ambassador.  Services include educational resources and transportation.

## 2020-11-02 RX ORDER — FOLIC ACID 1 MG/1
1 TABLET ORAL DAILY
Qty: 90 TABLET | Refills: 3 | Status: SHIPPED | OUTPATIENT
Start: 2020-11-02 | End: 2021-02-05 | Stop reason: HOSPADM

## 2020-11-02 RX ORDER — TOPIRAMATE 50 MG/1
TABLET, FILM COATED ORAL
Qty: 120 TABLET | Refills: 0 | Status: SHIPPED | OUTPATIENT
Start: 2020-11-02 | End: 2021-01-15 | Stop reason: SDUPTHER

## 2020-11-05 RX ORDER — TOPIRAMATE 50 MG/1
TABLET, FILM COATED ORAL
Qty: 360 TABLET | OUTPATIENT
Start: 2020-11-05

## 2021-01-15 RX ORDER — TOPIRAMATE 50 MG/1
100 TABLET, FILM COATED ORAL 2 TIMES DAILY
Qty: 120 TABLET | Refills: 5 | Status: SHIPPED | OUTPATIENT
Start: 2021-01-15 | End: 2021-07-12

## 2021-02-05 ENCOUNTER — OFFICE VISIT (OUTPATIENT)
Dept: NEUROLOGY | Facility: CLINIC | Age: 31
End: 2021-02-05

## 2021-02-05 ENCOUNTER — LAB (OUTPATIENT)
Dept: LAB | Facility: HOSPITAL | Age: 31
End: 2021-02-05

## 2021-02-05 VITALS
HEIGHT: 68 IN | OXYGEN SATURATION: 99 % | WEIGHT: 246 LBS | DIASTOLIC BLOOD PRESSURE: 78 MMHG | SYSTOLIC BLOOD PRESSURE: 108 MMHG | HEART RATE: 91 BPM | BODY MASS INDEX: 37.28 KG/M2

## 2021-02-05 DIAGNOSIS — G40.309 GENERALIZED EPILEPSY (HCC): Primary | ICD-10-CM

## 2021-02-05 LAB
ALBUMIN SERPL-MCNC: 4.6 G/DL (ref 3.5–5.2)
ALBUMIN/GLOB SERPL: 2.2 G/DL
ALP SERPL-CCNC: 49 U/L (ref 39–117)
ALT SERPL W P-5'-P-CCNC: 23 U/L (ref 1–33)
ANION GAP SERPL CALCULATED.3IONS-SCNC: 11.3 MMOL/L (ref 5–15)
AST SERPL-CCNC: 20 U/L (ref 1–32)
BILIRUB SERPL-MCNC: 0.4 MG/DL (ref 0–1.2)
BUN SERPL-MCNC: 15 MG/DL (ref 6–20)
BUN/CREAT SERPL: 15 (ref 7–25)
CALCIUM SPEC-SCNC: 9.9 MG/DL (ref 8.6–10.5)
CHLORIDE SERPL-SCNC: 107 MMOL/L (ref 98–107)
CO2 SERPL-SCNC: 23.7 MMOL/L (ref 22–29)
CREAT SERPL-MCNC: 1 MG/DL (ref 0.57–1)
GFR SERPL CREATININE-BSD FRML MDRD: 66 ML/MIN/1.73
GLOBULIN UR ELPH-MCNC: 2.1 GM/DL
GLUCOSE SERPL-MCNC: 83 MG/DL (ref 65–99)
POTASSIUM SERPL-SCNC: 4.5 MMOL/L (ref 3.5–5.2)
PROT SERPL-MCNC: 6.7 G/DL (ref 6–8.5)
SODIUM SERPL-SCNC: 142 MMOL/L (ref 136–145)

## 2021-02-05 PROCEDURE — 80053 COMPREHEN METABOLIC PANEL: CPT | Performed by: PSYCHIATRY & NEUROLOGY

## 2021-02-05 PROCEDURE — 99213 OFFICE O/P EST LOW 20 MIN: CPT | Performed by: PSYCHIATRY & NEUROLOGY

## 2021-02-05 PROCEDURE — 36415 COLL VENOUS BLD VENIPUNCTURE: CPT | Performed by: PSYCHIATRY & NEUROLOGY

## 2021-02-05 RX ORDER — BUPROPION HCL 100 MG
50 TABLET ORAL DAILY
COMMUNITY
End: 2021-10-08

## 2021-02-05 NOTE — PROGRESS NOTES
Subjective:     Patient ID: Aubrie Hilton is a 30 y.o. female.    Ms. Hilton is a 30-year-old female with a history of epilepsy who presents today as an established patient for follow-up for seizures.  The patient was last seen October 30, 2020 as a new patient.  Her seizures started in 2010.  They got worse around the time she had her daughter who is now 6 years old.  She is having seizures about once per month.  She is currently on Keppra 500 mg twice a day, Topamax 100 mg twice a day (this was just increased at her last visit, and lamotrigine 200 mg twice a day.  The patient has an Implanon device but is not currently sexually active.  Since I last saw her Wellbutrin was started in December.  She denies any side effects to the increased dose of her Topamax.  Overall she feels better.  Her medications are affordable.  She denies any seizures since I last saw her.    The following portions of the patient's history were reviewed and updated as appropriate: allergies, current medications, past family history, past medical history, past social history, past surgical history and problem list.    Review of Systems   Respiratory: Negative for cough, chest tightness and shortness of breath.    Cardiovascular: Negative for chest pain, palpitations and leg swelling.   Neurological: Negative for seizures.        Objective:    Neurologic Exam    Physical Exam    Assessment/Plan:    The patient is a 30-year-old female with history of epilepsy who presents today for follow-up.    1.  Genetic epilepsy with primary generalized seizures-fortunately the patient denies any seizures and no side effects to her current medications.  She is concerned about her medication burden and would like to get off Keppra.  This is not unreasonable.  I recommend decreasing to 250 mg twice a day for 2 weeks and then stopping the medications.  I did recommend for her to use condoms due to possible decreased effectiveness of her Implanon device  with her seizure medications.  Also recommend folic acid.  I would like for her to get her blood work that was ordered at the last visit.  We will see the patient back in 6 months time or sooner if needed.    A total of 20 minutes of time was spent on this encounter today including reviewing the patient's records, face-to-face time, and documentation.       Problems Addressed this Visit     None      Visit Diagnoses     Generalized epilepsy (CMS/HCC)    -  Primary      Diagnoses       Codes Comments    Generalized epilepsy (CMS/HCC)    -  Primary ICD-10-CM: G40.309  ICD-9-CM: 345.90

## 2021-02-05 NOTE — PATIENT INSTRUCTIONS
**Cut keppra in half and take half a tab (250 mg) twice daily for 2 weeks, then stop**  Continue lamotrigine (lamictal) and topiramate (topamax) at current dose.      [facility]  Eileen Vidales MD  Neurology clinic  [phone]    With anti-seizure medications, you may initially notice side effects of fatigue, drowsiness, unsteadiness, and dizziness.  Other possible side effects include nausea, abdominal pain, headache, blurry or double vision, slurred speech and mood changes.  Generally, patients will noticed these symptoms when the medication is first started or with higher doses and will go away with time.    It is import to consistently take your medication every day.  Missing just one dose may put you at risk for a breakthrough seizure.  Consider using reminders on your phone or a pill box.    If you develop a rash, please call the neurology clinic immediately or notify another healthcare professional, as this may be potentially life-threatening.  If you are unable to reach a healthcare professional, go to the emergency room immediately for further evaluation.    If you develop thoughts of wanting to hurt yourself or others, please call the neurology clinic immediately to notify another healthcare professional.  If you are unable to reach a healthcare professional, go to the emergency room immediately for further evaluation.    Taking anti-seizure medications may increase the risk of birth defects.  If you are a female of child-bearing potential, it is recommended that you take folic acid (1-4 mg) daily.  This may reduce the risk of birth defects while pregnant and taking seizure medication.  If you become pregnant, contact our office immediately. You will need to be followed very closely (at least monthly appointments).  I also recommend contacting The North American Antiepileptic Drug Pregnancy Registry at www.aedpregnancyregistry.org or 1-720.589.5915.    It is the [place] state law  that you cannot drive within 90 days of a seizure.    You should avoid certain activities that if you were to have a seizure, you could harm yourself or others. In general, it is recommended that you avoid operating heavy machinery or power tools, swimming or taking baths by yourself (showers are ok), don't stand over open flames, don't get on high ladders or the roof.  I also recommend to avoid sleeping on your stomach.    For further information on epilepsy and resources available to patients and their families, please visit the Epilepsy Foundation [facility] at www.efky.org or call [phone].    **Check out the Epilepsy Foundation [facility]'s monthly Art Group Gathering.  They are located at [facility], [address] [place] [address].  Call Ivis Boswell at [phone] or email her at [email or web address] for the dates of future gatherings.**      **If you have having memory problems, consider HOBSCOTCH (Home-Based Self-management and Cognitive Training Changes lives).  It is an 8 week self-management program for adults with epilepsy and memory problems.  The program is free at the Epilepsy [facility].  Contact Adali Martell at [phone] or [email or web address].**    Check out My Epilepsy Story ([email or web address]).  Their goal is to foster the growth of young girls and women affected by epilepsy and encourage them not just to lives, but to THRIVE!  You can get involved by donating, sharing your story, or becoming an ambassador.  Services include educational resources and transportation.

## 2021-02-08 ENCOUNTER — TELEPHONE (OUTPATIENT)
Dept: NEUROLOGY | Facility: CLINIC | Age: 31
End: 2021-02-08

## 2021-02-08 NOTE — TELEPHONE ENCOUNTER
----- Message from Eileen Vidales MD sent at 2/5/2021  4:37 PM EST -----  Can you let this patient know that her blood work came back normal?  Thanks!

## 2021-06-14 RX ORDER — LAMOTRIGINE 200 MG/1
200 TABLET ORAL 2 TIMES DAILY
Qty: 180 TABLET | Refills: 3 | Status: SHIPPED | OUTPATIENT
Start: 2021-06-14 | End: 2022-06-07

## 2021-07-12 RX ORDER — TOPIRAMATE 100 MG/1
100 TABLET, FILM COATED ORAL 2 TIMES DAILY
Qty: 60 TABLET | Refills: 11 | Status: SHIPPED | OUTPATIENT
Start: 2021-07-12 | End: 2022-03-21

## 2021-07-12 NOTE — TELEPHONE ENCOUNTER
Attempted to call patient to find out exactly how much of topamax she is taking so a correct refill can be sent over. No answer. LM for patient to call back.   Thank you

## 2021-10-08 ENCOUNTER — OFFICE VISIT (OUTPATIENT)
Dept: NEUROLOGY | Facility: CLINIC | Age: 31
End: 2021-10-08

## 2021-10-08 ENCOUNTER — LAB (OUTPATIENT)
Dept: LAB | Facility: HOSPITAL | Age: 31
End: 2021-10-08

## 2021-10-08 VITALS
DIASTOLIC BLOOD PRESSURE: 74 MMHG | WEIGHT: 215.94 LBS | HEART RATE: 78 BPM | OXYGEN SATURATION: 99 % | SYSTOLIC BLOOD PRESSURE: 122 MMHG | BODY MASS INDEX: 32.73 KG/M2 | HEIGHT: 68 IN

## 2021-10-08 DIAGNOSIS — G47.419 PRIMARY NARCOLEPSY WITHOUT CATAPLEXY: ICD-10-CM

## 2021-10-08 DIAGNOSIS — G40.309 GENERALIZED EPILEPSY (HCC): Primary | ICD-10-CM

## 2021-10-08 DIAGNOSIS — R53.82 CHRONIC FATIGUE: ICD-10-CM

## 2021-10-08 DIAGNOSIS — G47.10 HYPERSOMNIA: ICD-10-CM

## 2021-10-08 LAB
25(OH)D3 SERPL-MCNC: 61.3 NG/ML (ref 30–100)
TSH SERPL DL<=0.05 MIU/L-ACNC: 1.75 UIU/ML (ref 0.27–4.2)
VIT B12 BLD-MCNC: 865 PG/ML (ref 211–946)

## 2021-10-08 PROCEDURE — 82306 VITAMIN D 25 HYDROXY: CPT | Performed by: PSYCHIATRY & NEUROLOGY

## 2021-10-08 PROCEDURE — 99215 OFFICE O/P EST HI 40 MIN: CPT | Performed by: PSYCHIATRY & NEUROLOGY

## 2021-10-08 PROCEDURE — 36415 COLL VENOUS BLD VENIPUNCTURE: CPT | Performed by: PSYCHIATRY & NEUROLOGY

## 2021-10-08 PROCEDURE — 82607 VITAMIN B-12: CPT | Performed by: PSYCHIATRY & NEUROLOGY

## 2021-10-08 PROCEDURE — 84443 ASSAY THYROID STIM HORMONE: CPT | Performed by: PSYCHIATRY & NEUROLOGY

## 2021-10-08 RX ORDER — LEVETIRACETAM 500 MG/1
500 TABLET ORAL NIGHTLY
Qty: 30 TABLET | Refills: 11 | Status: SHIPPED | OUTPATIENT
Start: 2021-10-08 | End: 2022-01-10

## 2021-10-08 NOTE — PROGRESS NOTES
Subjective:     Patient ID: Aubrie Hilton is a 30 y.o. female.    Ms. Hilton is a 30-year-old female with a history of epilepsy who presents to the neurology clinic today as established patient for follow-up for seizures.  The patient was last seen on February 5 of 2021.  Her seizures started in 2010.  They were worse around 6 years ago.  She was having seizures about once a month.  She is currently on topiramate 100 mg twice a day and lamotrigine 200 mg twice a day.  At her last visit we weaned her off of levetiracetam.  The patient has the Implanon device.  She is no longer on Wellbutrin.  At her last visit we did a complete metabolic panel for drug monitoring and it was normal.  She denies that she is currently family-planning.  She is not taking folic acid.  She denies any seizures since I last saw her.  She does report that since stopping the Keppra she has random twitches only when she is laying down around bedtime.  It occurs every night.  She also has had 2 auras.  She feels like a seizure might be coming on they occurred last summer.  She thinks it was when she was stressed and overwhelmed in a bad relationship.  The patient reports that she is exhausted all the time and does have snoring as well as frequent sleep paralysis.  She denies hypnic hallucinations and cataplexy.    The following portions of the patient's history were reviewed and updated as appropriate: allergies, current medications, past family history, past medical history, past social history, past surgical history and problem list.    Review of Systems     Objective:    Neurologic Exam    Physical Exam    Assessment/Plan:    The patient is a 30-year-old female with a history of epilepsy who presents today for follow-up.    1.  Genetic epilepsy with primary generalized seizures-the patient has not had a big seizure since I last saw her.  It sounds like she may be experiencing some myoclonus since levetiracetam was weaned off.  We decided  to add 500 mg at night.  If she continues to have symptoms we could switch this to extended release or increase the dose to 750.  If she has side effects we could try to increase her nighttime dose of topiramate instead to 150 mg.  Due to her exhaustion and daytime fatigue I would like to get a B12 level, vitamin D and a TSH.  We discussed that if she is sexually active with men I would recommend folic acid 1 mg daily.    2.  Hypersomnia-this could be from sleep disordered bleeding such as sleep apnea however the patient also reports sleep paralysis which may could be concerning for narcolepsy.  For further evaluation I would like to get an in lab polysomnogram followed by next day multiple sleep latency test.    A total of 40 minutes of time was spent on this encounter today.  This includes reviewing the patient's records, face-to-face time, documentation.       Problems Addressed this Visit     None      Visit Diagnoses     Generalized epilepsy (HCC)    -  Primary    Relevant Medications    levETIRAcetam (KEPPRA) 500 MG tablet    Hypersomnia        Relevant Orders    Polysomnography 4 or more parameters    Urine Drug Screen - Urine, Clean Catch    Multiple sleep latency test without CPAP    TSH Rfx On Abnormal To Free T4    Vitamin B12    Vitamin D 25 Hydroxy    Primary narcolepsy without cataplexy        Relevant Orders    Polysomnography 4 or more parameters    Urine Drug Screen - Urine, Clean Catch    Multiple sleep latency test without CPAP    Chronic fatigue        Relevant Orders    TSH Rfx On Abnormal To Free T4    Vitamin B12    Vitamin D 25 Hydroxy      Diagnoses       Codes Comments    Generalized epilepsy (HCC)    -  Primary ICD-10-CM: G40.309  ICD-9-CM: 345.90     Hypersomnia     ICD-10-CM: G47.10  ICD-9-CM: 780.54     Primary narcolepsy without cataplexy     ICD-10-CM: G47.419  ICD-9-CM: 347.00     Chronic fatigue     ICD-10-CM: R53.82  ICD-9-CM: 780.79

## 2021-10-08 NOTE — PATIENT INSTRUCTIONS
[facility]  Eileen Vidales MD  Neurology clinic  [phone]    With anti-seizure medications, you may initially notice side effects of fatigue, drowsiness, unsteadiness, and dizziness.  Other possible side effects include nausea, abdominal pain, headache, blurry or double vision, slurred speech and mood changes.  Generally, patients will noticed these symptoms when the medication is first started or with higher doses and will go away with time.    It is import to consistently take your medication every day.  Missing just one dose may put you at risk for a breakthrough seizure.  Consider using reminders on your phone or a pill box.    If you develop a rash, please call the neurology clinic immediately or notify another healthcare professional, as this may be potentially life-threatening.  If you are unable to reach a healthcare professional, go to the emergency room immediately for further evaluation.    If you develop thoughts of wanting to hurt yourself or others, please call the neurology clinic immediately to notify another healthcare professional.  If you are unable to reach a healthcare professional, go to the emergency room immediately for further evaluation.    Taking anti-seizure medications may increase the risk of birth defects.  If you are a female of child-bearing potential, it is recommended that you take folic acid (1-4 mg) daily.  This may reduce the risk of birth defects while pregnant and taking seizure medication.  If you become pregnant, contact our office immediately. You will need to be followed very closely (at least monthly appointments).  I also recommend contacting The North American Antiepileptic Drug Pregnancy Registry at www.aedpregnancyregistry.org or 1-181.406.6298.    It is the [place] state law that you cannot drive within 90 days of a seizure.    You should avoid certain activities that if you were to have a seizure, you could harm yourself or others. In  general, it is recommended that you avoid operating heavy machinery or power tools, swimming or taking baths by yourself (showers are ok), don't stand over open flames, don't get on high ladders or the roof.  I also recommend to avoid sleeping on your stomach.    For further information on epilepsy and resources available to patients and their families, please visit the Epilepsy Foundation [facility] at www.efky.org or call [phone].    **Check out the Epilepsy Foundation [facility]'s monthly Art Group Gathering.  They are located at [facility] [place], [address] [place] [address].  Call Ivis Boswell at [phone] or email her at [email or web address] for the dates of future gatherings.**      **If you have having memory problems, consider HOBSCOTCH (Home-Based Self-management and Cognitive Training Changes lives).  It is an 8 week self-management program for adults with epilepsy and memory problems.  The program is free at the Epilepsy [facility].  Contact Adali Martell at [phone] or [email or web address].**    Check out My Epilepsy Story ([email or web address]).  Their goal is to foster the growth of young girls and women affected by epilepsy and encourage them not just to lives, but to THRIVE!  You can get involved by donating, sharing your story, or becoming an ambassador.  Services include educational resources and transportation.

## 2021-10-12 ENCOUNTER — LAB (OUTPATIENT)
Dept: LAB | Facility: HOSPITAL | Age: 31
End: 2021-10-12

## 2021-10-12 DIAGNOSIS — G47.10 HYPERSOMNIA: ICD-10-CM

## 2021-10-12 DIAGNOSIS — G47.419 PRIMARY NARCOLEPSY WITHOUT CATAPLEXY: ICD-10-CM

## 2021-10-12 LAB
AMPHET+METHAMPHET UR QL: NEGATIVE
BARBITURATES UR QL SCN: NEGATIVE
BENZODIAZ UR QL SCN: NEGATIVE
CANNABINOIDS SERPL QL: POSITIVE
COCAINE UR QL: NEGATIVE
METHADONE UR QL SCN: NEGATIVE
OPIATES UR QL: NEGATIVE
OXYCODONE UR QL SCN: NEGATIVE

## 2021-10-12 PROCEDURE — 80307 DRUG TEST PRSMV CHEM ANLYZR: CPT

## 2022-01-07 ENCOUNTER — PATIENT MESSAGE (OUTPATIENT)
Dept: NEUROLOGY | Facility: CLINIC | Age: 32
End: 2022-01-07

## 2022-01-10 RX ORDER — LEVETIRACETAM 500 MG/1
500 TABLET, EXTENDED RELEASE ORAL DAILY
Qty: 30 TABLET | Refills: 11 | Status: SHIPPED | OUTPATIENT
Start: 2022-01-10 | End: 2022-01-28

## 2022-01-10 NOTE — TELEPHONE ENCOUNTER
From: Aubrie Hilton  To: Eileen Vidales MD  Sent: 1/7/2022 12:21 PM EST  Subject: Twitching/medication question    Hello! I have been putting this message off hoping I did NOT need to up my Keppra due to it making me so SO irritable and angry. My last appointment in October is when we put me back on it again due to me having random twitches. Well I still have them. Before I'd rate then 10/10. Now I'd rate them a 3/10 and not every night. I am not sure if that is a concern or not. I just fear if I do up my Keppra again that my side effects will become even worse or if there is anything we can put me on for the anger/irritability. I'm fully aware it's the medication so it helps, but sometimes I just snap and my tone is rude and it just upsets me for my daughter. I have also looked into different medications for anger/irritability and they all make you gain weight which is a big fear of mine as well. I don't what to do. Or what you think my best option should be, but either way my side effects are REAL on Keppra. I never paid attention or looked into it before when I was on it, but now I know and I   can almost not control myself.     Thank you!  Aubrie Hilton

## 2022-01-19 ENCOUNTER — OFFICE VISIT (OUTPATIENT)
Dept: NEUROLOGY | Facility: CLINIC | Age: 32
End: 2022-01-19

## 2022-01-19 VITALS
HEART RATE: 78 BPM | SYSTOLIC BLOOD PRESSURE: 120 MMHG | OXYGEN SATURATION: 98 % | WEIGHT: 220.8 LBS | DIASTOLIC BLOOD PRESSURE: 72 MMHG | HEIGHT: 68 IN | BODY MASS INDEX: 33.46 KG/M2

## 2022-01-19 DIAGNOSIS — R56.9 CONVULSIONS, UNSPECIFIED CONVULSION TYPE: Primary | ICD-10-CM

## 2022-01-19 DIAGNOSIS — G40.309 GENERALIZED EPILEPSY: ICD-10-CM

## 2022-01-19 PROCEDURE — 99215 OFFICE O/P EST HI 40 MIN: CPT | Performed by: PSYCHIATRY & NEUROLOGY

## 2022-01-19 RX ORDER — FOLIC ACID 1 MG/1
1 TABLET ORAL DAILY
Qty: 30 TABLET | Refills: 11 | Status: SHIPPED | OUTPATIENT
Start: 2022-01-19 | End: 2022-12-09 | Stop reason: SDUPTHER

## 2022-01-19 RX ORDER — LACOSAMIDE 50 MG/1
50 TABLET ORAL EVERY 12 HOURS SCHEDULED
Qty: 60 TABLET | Refills: 5 | Status: SHIPPED | OUTPATIENT
Start: 2022-01-19 | End: 2022-03-21

## 2022-01-19 NOTE — PATIENT INSTRUCTIONS
Keep the lamotrigine at 200 mg twice a day.    Keep the topiramate at 100 mg twice a day.    Cut the levetiracetam to 250 mg at night.    Add vimpat 50 mg twice a day.

## 2022-01-19 NOTE — PROGRESS NOTES
Subjective:   The patient is a 31-year-old female with a history of epilepsy who presents to neurology clinic today as an established patient for follow-up for seizures.  The patient was last seen October 8, 2021.  She was a new patient on February 5, 2021.  Her seizures started in 2010.  In the past she was having 1 seizure a month.  She is currently on topiramate 100 mg twice a day and lamotrigine 200 mg twice a day.  We had weaned her off of levetiracetam due to side effects however at her last visit she reported nighttime myoclonus.  We started her on 500 mg at night.  That improved to 3 times a week but she is having significant mood issues.  We switched her to extended release and she was on that for 3 days but she felt like she was going to have a seizure so she has been back on the 500 mg of immediate release for the past week.  The patient is a single mom.  The patient reports that she would like to have more children in the next 2 to 3 years.  She currently has the Implanon device that is set to come out sometime next year.  She does not want to have it replaced.  She is not currently on folic acid.  She voices today that she would like to wean off of the topiramate before pregnancy because she does not want to risk the 4.4% risk of birth defects associated with topiramate.      Patient ID: Aubrie Hilton is a 31 y.o. female.    The following portions of the patient's history were reviewed and updated as appropriate: allergies, current medications, past family history, past medical history, past social history, past surgical history and problem list.    Review of Systems     Objective:    Neurologic Exam    Physical Exam    Assessment/Plan:    The patient is a 31-year-old female with history of epilepsy who presents today for follow-up.    1.  Genetic epilepsy with primary generalized seizures-fortunately the patient has not had any significant seizures but continues to have myoclonus several times a  week.  That improved with levetiracetam but she is experiencing intolerable side effects.  The patient voices that she would like to proceed with future pregnancies and is not willing to risk the 4% chance of birth defects with topiramate and would like to switch that to a different medication.  We discussed the options of Vimpat and Briviact.  The patient would like to transition to Vimpat.  We will go ahead and start 50 mg twice a day and I would recommend for her to cut back her levetiracetam to 250 mg.  I recommend keeping the lamotrigine and the topiramate the same for now.  In 2 months, I would want to decrease her topiramate to 50 mg twice a day for 2 months then increase Vimpat to 100 mg twice a day for 2 months and then decrease topiramate to 25 mg twice a day for 2 months then increase Vimpat to 150 mg twice a day for 2 months and then stop topiramate.  Hopefully at the end of the titration we can have her on lamotrigine and Vimpat polytherapy only.  The patient voiced that she felt comfortable with this plan.  This would take about 10 months to get her off of the topiramate.    A total of 40 minutes of time was spent on this encounter today.  This includes reviewing patient's records, face-to-face time, and documentation.       Problems Addressed this Visit        Neuro    Attack, epileptiform (HCC) - Primary    Relevant Medications    lacosamide (Vimpat) 50 MG tablet tablet      Other Visit Diagnoses     Generalized epilepsy (HCC)        Relevant Medications    lacosamide (Vimpat) 50 MG tablet tablet      Diagnoses       Codes Comments    Convulsions, unspecified convulsion type (HCC)    -  Primary ICD-10-CM: R56.9  ICD-9-CM: 780.39     Generalized epilepsy (HCC)     ICD-10-CM: G40.309  ICD-9-CM: 345.90

## 2022-01-21 ENCOUNTER — TELEPHONE (OUTPATIENT)
Dept: NEUROLOGY | Facility: CLINIC | Age: 32
End: 2022-01-21

## 2022-01-28 RX ORDER — LEVETIRACETAM 500 MG/1
500 TABLET ORAL 2 TIMES DAILY
Qty: 60 TABLET | Refills: 11 | Status: SHIPPED | OUTPATIENT
Start: 2022-01-28 | End: 2022-05-03

## 2022-03-21 ENCOUNTER — OFFICE VISIT (OUTPATIENT)
Dept: NEUROLOGY | Facility: CLINIC | Age: 32
End: 2022-03-21

## 2022-03-21 VITALS
BODY MASS INDEX: 32.77 KG/M2 | SYSTOLIC BLOOD PRESSURE: 120 MMHG | WEIGHT: 216.2 LBS | DIASTOLIC BLOOD PRESSURE: 74 MMHG | HEART RATE: 74 BPM | OXYGEN SATURATION: 99 % | HEIGHT: 68 IN

## 2022-03-21 DIAGNOSIS — G40.309 GENERALIZED EPILEPSY: Primary | ICD-10-CM

## 2022-03-21 PROCEDURE — 99213 OFFICE O/P EST LOW 20 MIN: CPT | Performed by: PSYCHIATRY & NEUROLOGY

## 2022-03-21 RX ORDER — TOPIRAMATE 100 MG/1
TABLET, FILM COATED ORAL
Qty: 45 TABLET | Refills: 1 | Status: SHIPPED | OUTPATIENT
Start: 2022-03-21 | End: 2022-09-26 | Stop reason: CLARIF

## 2022-03-21 RX ORDER — HYDROXYZINE HYDROCHLORIDE 25 MG/1
TABLET, FILM COATED ORAL
COMMUNITY
Start: 2022-03-09

## 2022-03-21 NOTE — PROGRESS NOTES
Subjective:     Patient ID: Aubrie Hilton is a 31 y.o. female.    Ms. Hilton is a 31-year-old female with a history of genetic epilepsy with primary generalized seizures who presents to the neurology clinic today as an established patient for follow-up for seizures.  The patient was last seen on January 19, 2022.  She was a new patient to me on February 5, 2021.  His seizures started in 2010.  In the past she was having about 1 seizure a month.  She is currently on topiramate 100 mg twice a day, lamotrigine 200 mg twice a day, and levetiracetam 500 mg twice a day.  In the past we tried to wean it off due to side effects however due to her wanting to come off of topiramate due to future pregnancies, she elected to go back on the medication.  Unfortunately she was unable to afford Vimpat which was our previous strategy.  The patient reports that she may feel little more frustrated but has not noticed much of a difference.  She still has the Implanon device and has started her folic acid.  She denies any seizures or any myoclonus.    The following portions of the patient's history were reviewed and updated as appropriate: allergies, current medications, past family history, past medical history, past social history, past surgical history and problem list.    Review of Systems     Objective:    Neurologic Exam    Physical Exam    Assessment/Plan:    The patient is a 31-year-old female with history of epilepsy who presents today for follow-up.    1.  Genetic epilepsy-fortunately she remains seizure-free without major side effects to lamotrigine, topiramate, and levetiracetam polytherapy.  Her goal is to transition off the topiramate for future pregnancies.  I recommend decreasing her dose to 100 mg in the morning and 50 mg at night for 2 months.  Then we will increase her levetiracetam to 750 mg twice a day.  When she has been on this dose for 2 months, we can further decrease her topiramate to 50 mg twice a day.  We  will see the patient back in 6 months or sooner if needed.  We will continue to titrate her levetiracetam up and wean her topiramate down.    A total of 20 minutes of time was spent on this encounter today.  This includes reviewing the patient's records, face-to-face time, documentation.       Problems Addressed this Visit    None     Visit Diagnoses     Generalized epilepsy (HCC)    -  Primary    Relevant Medications    topiramate (TOPAMAX) 100 MG tablet      Diagnoses       Codes Comments    Generalized epilepsy (HCC)    -  Primary ICD-10-CM: G40.309  ICD-9-CM: 345.90

## 2022-03-21 NOTE — PATIENT INSTRUCTIONS
Decrease topiramate to 100 mg in the morning and 50 mg at night for 2 months.  Then increase keppra to 750 mg twice a day.  After 2 months, decrease topiramate to 50 mg twice a day.

## 2022-05-01 ENCOUNTER — PATIENT MESSAGE (OUTPATIENT)
Dept: NEUROLOGY | Facility: CLINIC | Age: 32
End: 2022-05-01

## 2022-05-02 NOTE — TELEPHONE ENCOUNTER
From: Aubrie Hilton  To: Eileen Vidales MD  Sent: 5/1/2022 11:14 AM EDT  Subject: Levetiracetam    Hello! I was wondering if you called Levetiracetam in for me at our last visit. I had a prescription of the Topiramate so I now have plenty if that since we are slowly taking myself off of that, but I do not have enough of my Levetiracetam now.

## 2022-05-03 RX ORDER — LEVETIRACETAM 750 MG/1
750 TABLET ORAL 2 TIMES DAILY
Qty: 60 TABLET | Refills: 11 | Status: SHIPPED | OUTPATIENT
Start: 2022-05-03 | End: 2022-07-20

## 2022-06-07 RX ORDER — LAMOTRIGINE 200 MG/1
TABLET ORAL
Qty: 180 TABLET | Refills: 3 | Status: SHIPPED | OUTPATIENT
Start: 2022-06-07 | End: 2022-11-30

## 2022-07-19 ENCOUNTER — PATIENT MESSAGE (OUTPATIENT)
Dept: NEUROLOGY | Facility: CLINIC | Age: 32
End: 2022-07-19

## 2022-07-19 NOTE — TELEPHONE ENCOUNTER
From: Eileen Vidales MD  To: Aubrie ORELLANA Yobani  Sent: 7/19/2022 9:54 AM EDT  Subject: Medication follow up    Ms. Hilton, I wanted to check in with you and see if you have been on the higher dose of keppra for at least 2 months. If so, do you want to go ahead and decrease your topiramate to 50 mg twice a day?     Sincerely,  Eileen Vidales MD  [facility] Neurology  [phone]

## 2022-07-20 RX ORDER — LEVETIRACETAM 1000 MG/1
1000 TABLET ORAL 2 TIMES DAILY
Qty: 60 TABLET | Refills: 11 | Status: SHIPPED | OUTPATIENT
Start: 2022-07-20 | End: 2022-08-22

## 2022-08-18 ENCOUNTER — TELEPHONE (OUTPATIENT)
Dept: NEUROLOGY | Facility: CLINIC | Age: 32
End: 2022-08-18

## 2022-08-18 NOTE — TELEPHONE ENCOUNTER
Lm for pt letting her know that we have r/s her appt for 8.22.22 at 1:30 pm and to call if this is not a good date or time

## 2022-08-22 ENCOUNTER — OFFICE VISIT (OUTPATIENT)
Dept: NEUROLOGY | Facility: CLINIC | Age: 32
End: 2022-08-22

## 2022-08-22 ENCOUNTER — TELEPHONE (OUTPATIENT)
Dept: NEUROLOGY | Facility: CLINIC | Age: 32
End: 2022-08-22

## 2022-08-22 VITALS
HEIGHT: 68 IN | OXYGEN SATURATION: 98 % | DIASTOLIC BLOOD PRESSURE: 70 MMHG | HEART RATE: 78 BPM | SYSTOLIC BLOOD PRESSURE: 120 MMHG | WEIGHT: 207.4 LBS | BODY MASS INDEX: 31.43 KG/M2

## 2022-08-22 DIAGNOSIS — G40.309 GENERALIZED EPILEPSY: Primary | ICD-10-CM

## 2022-08-22 PROCEDURE — 99215 OFFICE O/P EST HI 40 MIN: CPT | Performed by: PSYCHIATRY & NEUROLOGY

## 2022-08-22 RX ORDER — LACOSAMIDE 50 MG/1
50 TABLET ORAL EVERY 12 HOURS SCHEDULED
Qty: 60 TABLET | Refills: 5 | Status: SHIPPED | OUTPATIENT
Start: 2022-08-22 | End: 2022-09-13

## 2022-08-22 RX ORDER — LEVETIRACETAM 500 MG/1
750 TABLET ORAL 2 TIMES DAILY
Qty: 90 TABLET | Refills: 11 | Status: SHIPPED | OUTPATIENT
Start: 2022-08-22 | End: 2022-11-30 | Stop reason: SDUPTHER

## 2022-08-22 NOTE — PROGRESS NOTES
Subjective:     Patient ID: Aubrie Hilton is a 31 y.o. female.    The patient is a 31-year-old female with a history of genetic epilepsy with primary generalized seizures who presents to the neurology clinic today as an established patient for follow-up for seizures.  The patient was last seen on March 21, 2022.  She was a new patient to me on February 5, 2021.  Her seizures started when she was 19, around 2010.  In the past she was having about 1 seizure a month.  She was on topiramate 100 mg twice a day, lamotrigine 200 mg twice a day, and levetiracetam 500 mg twice a day.  In the past we tried to wean the levetiracetam due to side effects.  However the patient was wanting to come off topiramate due to future pregnancies and therefore we elected to restart the medication.  She was unable to afford namebrand Vimpat in the past.  She has the Implanon device for contraception is on folic acid.  They also use condoms.  She is going to get her Implanon out in May which is 9 months from now.  She denies any seizures since her last visit.  We had been trying to increase her levetiracetam and decrease her topiramate.  She is currently on levetiracetam 1000 mg twice a day and topiramate 50 mg twice a day.  She reports that she has had a significant change in her mood.  She has had increasing crying as well as meltdowns.  This is happening daily and started around May when we increased her levetiracetam from 500 mg twice a day to 750 mg twice a day.    The following portions of the patient's history were reviewed and updated as appropriate: allergies, current medications, past family history, past medical history, past social history, past surgical history and problem list.    Review of Systems     Objective:    Neurological Exam    Physical Exam    Assessment/Plan:    The patient is a 31-year-old female with a history of genetic epilepsy with primary generalized seizures who presents today for follow-up.    1.  Genetic  epilepsy-fortunately the patient has not had any seizures but is having significant side effects which is likely due to the increased dose of levetiracetam.  I recommend initiating generic lacosamide.  According to [facility] that should only cost her about $30 a month.  We will add 50 mg twice a day.  After about a week she can lower her levetiracetam down to 750 mg twice a day.  After being on the lower dose for 2 weeks wariness see how she is doing.  If she is doing okay we can further increase her lacosamide to 100 mg twice a day and decrease her levetiracetam back down to 500 mg twice a day.  If she does okay with that then we can try to transition her off of the topiramate.  I recommend continuing her lamotrigine at the same dose with no changes.  I will see her back in 6 weeks or sooner if needed.    A total of 40 minutes of time was spent on this encounter today.  This includes reviewing the patient's records, face-to-face time, documentation.       Problems Addressed this Visit    None     Visit Diagnoses     Generalized epilepsy (HCC)    -  Primary    Relevant Medications    lacosamide (VIMPAT) 50 MG tablet tablet    levETIRAcetam (KEPPRA) 500 MG tablet      Diagnoses       Codes Comments    Generalized epilepsy (HCC)    -  Primary ICD-10-CM: G40.309  ICD-9-CM: 345.90

## 2022-08-22 NOTE — PATIENT INSTRUCTIONS
Continue lamictal (200 mg BID) and topamax (50 mg BID) at current doses with no changes.  Start Vimpat at 50 mg BID.  After 1 week, decrease keppra to 750 mg BID.

## 2022-09-12 ENCOUNTER — TELEPHONE (OUTPATIENT)
Dept: NEUROLOGY | Facility: CLINIC | Age: 32
End: 2022-09-12

## 2022-09-12 ENCOUNTER — PATIENT MESSAGE (OUTPATIENT)
Dept: NEUROLOGY | Facility: CLINIC | Age: 32
End: 2022-09-12

## 2022-09-12 DIAGNOSIS — G40.309 GENERALIZED EPILEPSY: Primary | ICD-10-CM

## 2022-09-12 NOTE — TELEPHONE ENCOUNTER
Can you contact this patient and see how she is doing on vimpat 50 mg bid and keppra 750 mg bid?  If she is doing ok, let's increase vimpat to 100 mg bid and lower keppra to 500 mg bid.  Thanks!

## 2022-09-13 RX ORDER — LACOSAMIDE 100 MG/1
100 TABLET ORAL EVERY 12 HOURS SCHEDULED
Qty: 60 TABLET | Refills: 5 | Status: SHIPPED | OUTPATIENT
Start: 2022-09-13 | End: 2022-12-09 | Stop reason: SDUPTHER

## 2022-09-13 NOTE — TELEPHONE ENCOUNTER
From: Aubrie Hilton  To: Eileen Vidales MD  Sent: 9/12/2022 1:59 PM EDT  Subject: Vimpat update    Hey! I missed the office call earlier, but I'm doing great on the vimpat! It's actually helped my mood SO much and I'm not as exhausted as I always have been. I think It really is a great medicine for me!

## 2022-09-26 ENCOUNTER — TELEPHONE (OUTPATIENT)
Dept: NEUROLOGY | Facility: CLINIC | Age: 32
End: 2022-09-26

## 2022-09-26 RX ORDER — TOPIRAMATE 25 MG/1
TABLET ORAL
Qty: 180 TABLET | Refills: 3 | Status: SHIPPED | OUTPATIENT
Start: 2022-09-26 | End: 2022-10-05

## 2022-10-05 ENCOUNTER — OFFICE VISIT (OUTPATIENT)
Dept: NEUROLOGY | Facility: CLINIC | Age: 32
End: 2022-10-05

## 2022-10-05 VITALS
SYSTOLIC BLOOD PRESSURE: 120 MMHG | WEIGHT: 206 LBS | DIASTOLIC BLOOD PRESSURE: 72 MMHG | OXYGEN SATURATION: 99 % | HEART RATE: 72 BPM | BODY MASS INDEX: 31.22 KG/M2 | HEIGHT: 68 IN

## 2022-10-05 DIAGNOSIS — G40.309 GENERALIZED EPILEPSY: Primary | ICD-10-CM

## 2022-10-05 PROCEDURE — 99214 OFFICE O/P EST MOD 30 MIN: CPT | Performed by: PSYCHIATRY & NEUROLOGY

## 2022-10-05 NOTE — PATIENT INSTRUCTIONS
[facility]  Eileen Vidales MD  Neurology clinic  [phone]    With anti-seizure medications, you may initially notice side effects of fatigue, drowsiness, unsteadiness, and dizziness.  Other possible side effects include nausea, abdominal pain, headache, blurry or double vision, slurred speech and mood changes.  Generally, patients will noticed these symptoms when the medication is first started or with higher doses and will go away with time.    It is import to consistently take your medication every day.  Missing just one dose may put you at risk for a breakthrough seizure.  Consider using reminders on your phone or a pill box.    If you develop a rash, please call the neurology clinic immediately or notify another healthcare professional, as this may be potentially life-threatening.  If you are unable to reach a healthcare professional, go to the emergency room immediately for further evaluation.    If you develop thoughts of wanting to hurt yourself or others, please call the neurology clinic immediately to notify another healthcare professional.  If you are unable to reach a healthcare professional, go to the emergency room immediately for further evaluation.    Taking anti-seizure medications may increase the risk of birth defects.  If you are a female of child-bearing potential, it is recommended that you take folic acid (1-4 mg) daily.  This may reduce the risk of birth defects while pregnant and taking seizure medication.  If you become pregnant, contact our office immediately. You will need to be followed very closely (at least monthly appointments).  I also recommend contacting The [place] American Antiepileptic Drug Pregnancy Registry at www.aedpregnancyregistry.org or 1-106.689.7077.    It is the [place] state law that you cannot drive within 90 days of a seizure.    You should avoid certain activities that if you were to have a seizure, you could harm yourself or others. In  general, it is recommended that you avoid operating heavy machinery or power tools, swimming or taking baths by yourself (showers are ok), don't stand over open flames, don't get on high ladders or the roof.  I also recommend to avoid sleeping on your stomach.    For further information on epilepsy and resources available to patients and their families, please visit the [facility] at www.efky.org or call [phone].    **Check out the [facility]'s monthly Art Group Gathering.  They are [facility] at [facility] [place], [address] [place] [address].  Call Ivis Boswell at [phone] or email her at [email or web address] for the dates of future gatherings.**      **If you have having memory problems, consider HOBSCOTCH (Home-Based Self-management and Cognitive Training Changes lives).  It is an 8 week self-management program for adults with epilepsy and memory problems.  The program is free at the [facility].  Contact Adali Martell at [phone] or [email or web address].**    Check out My Epilepsy Story (MyEpilepsyStory.org).  Their goal is to foster the growth of young girls and women affected by epilepsy and encourage them not just to lives, but to THRIVE!  You can get involved by donating, sharing your story, or becoming an ambassador.  Services include educational resources and transportation.

## 2022-10-05 NOTE — PROGRESS NOTES
Subjective:     Patient ID: Aubrie Hilton is a 31 y.o. female.    History of Present Illness  The patient is a 31-year-old female with a history of genetic epilepsy with primary generalized seizures who presents to the neurology clinic today as established patient for follow-up.  The patient was last seen on August 22, 2022.  She is new patient to me back on February 5, 2021.  Her seizures started around 2010.  In the past she was having about 1 seizure a month.  She is currently taking topiramate 25 mg twice a day, lamotrigine 200 mg twice a day, levetiracetam 500 mg twice a day and lacosamide 100 mg twice a day.  We have been trying to transition her topiramate to lacosamide due to family planning.  She plans on getting her Implanon device out around May of next year.  Her last seizure was about a year ago.  She has been on this dose of lamotrigine for about 3 weeks.  She reports that her mood is better.  About a week after a dose decrease she has some increase in twitching but that usually goes away.  She has been on her current dose of lacosamide for a month.  She says in the past she does get a warning for seizures.  She feels it come on for about 5 minutes before she goes into a seizure.  She reported that she was paying about $75 a month for medications.  According to [facility], she should only be spending about $30 per month for lamotrigine, levetiracetam, and lacosamide.    The following portions of the patient's history were reviewed and updated as appropriate: allergies, current medications, past family history, past medical history, past social history, past surgical history and problem list.    Review of Systems     Objective:    Neurological Exam    Physical Exam    Assessment/Plan:    The patient is a 31-year-old female with a history of genetic epilepsy who presents today for follow-up.    1.  Genetic epilepsy-fortunately the patient has not had a seizure in the last year.  The patient would like to  get on the least amount of medications in preparation for pregnancy next spring.  She can go ahead and stop the topiramate.  At her next visit we will need to determine whether or not we want to wean down either lamotrigine or the levetiracetam.  I will see the patient back in 2 months or sooner if needed.    A total of 30 minutes of time was spent on encounter today.  This includes reviewing patient's records, face-to-face time, documentation.       Problems Addressed this Visit    None     Visit Diagnoses     Generalized epilepsy (HCC)    -  Primary      Diagnoses       Codes Comments    Generalized epilepsy (HCC)    -  Primary ICD-10-CM: G40.309  ICD-9-CM: 345.90

## 2022-11-21 ENCOUNTER — TELEPHONE (OUTPATIENT)
Dept: NEUROLOGY | Facility: CLINIC | Age: 32
End: 2022-11-21

## 2022-11-30 ENCOUNTER — OFFICE VISIT (OUTPATIENT)
Dept: NEUROLOGY | Facility: CLINIC | Age: 32
End: 2022-11-30

## 2022-11-30 VITALS
HEIGHT: 68 IN | HEART RATE: 78 BPM | BODY MASS INDEX: 32.86 KG/M2 | SYSTOLIC BLOOD PRESSURE: 124 MMHG | DIASTOLIC BLOOD PRESSURE: 74 MMHG | WEIGHT: 216.8 LBS | OXYGEN SATURATION: 99 %

## 2022-11-30 DIAGNOSIS — G40.309 GENERALIZED EPILEPSY: Primary | ICD-10-CM

## 2022-11-30 PROCEDURE — 99213 OFFICE O/P EST LOW 20 MIN: CPT | Performed by: PSYCHIATRY & NEUROLOGY

## 2022-11-30 RX ORDER — LEVETIRACETAM 500 MG/1
500 TABLET ORAL 2 TIMES DAILY
Qty: 60 TABLET | Refills: 11 | Status: SHIPPED | OUTPATIENT
Start: 2022-11-30 | End: 2022-12-09 | Stop reason: SDUPTHER

## 2022-11-30 RX ORDER — LACOSAMIDE 50 MG/1
50 TABLET ORAL DAILY
Qty: 30 TABLET | Refills: 5 | Status: SHIPPED | OUTPATIENT
Start: 2022-11-30 | End: 2022-12-09 | Stop reason: SDUPTHER

## 2022-11-30 RX ORDER — LAMOTRIGINE 100 MG/1
TABLET ORAL
Qty: 90 TABLET | Refills: 11 | Status: SHIPPED | OUTPATIENT
Start: 2022-11-30 | End: 2022-12-09 | Stop reason: SDUPTHER

## 2022-11-30 NOTE — PROGRESS NOTES
Subjective:     Patient ID: Aubrie Hilton is a 32 y.o. female.    History of Present Illness  The patient is a 32-year-old female with a history of genetic epilepsy with primary generalized seizures who presents to the neurology clinic today as established patient for follow-up.  She was last seen October 5, 2022.  She was a new patient to me back in February 2021.  Her seizures started around 2010.  In the past she was having up to 1 seizure a month.  She went seizure-free for about a year when she was a teenager on Keppra monotherapy but was having side effects.  This is the longest that she has gone without a seizure.  Her last seizure was over a year ago.  She is currently on lamotrigine 200 mg twice a day, levetiracetam 500 mg twice a day, and lacosamide 100 mg twice a day.  At her last visit we stopped topiramate.  We have been doing this because she is interested in family-planning next summer.  She currently has the Implanon device and takes folic acid.  She denies any seizures since her last visit.  Her last 1 was August or September of last year.  She did not do well with levetiracetam extended release because she felt foggy.  She is interested today in being on the least amount of medications as possible and would like to try to decrease her lamotrigine.    The following portions of the patient's history were reviewed and updated as appropriate: allergies, current medications, past family history, past medical history, past social history, past surgical history and problem list.    Review of Systems     Objective:    Neurological Exam    Physical Exam    Assessment/Plan:    The patient is a 32-year-old female with history of genetic epilepsy who presents today for follow-up.    1.  Genetic epilepsy-fortunately the patient has been seizure-free on her current regimen but she would like to decrease her overall medication burden in preparation for pregnancy which is not unreasonable.  We decided to  decrease her lamotrigine to 150 mg twice a day and to compensate for that increase her lacosamide dose to 150 mg in the morning.  The patient understands that by decreasing her medication she could have a breakthrough seizure which could cause injury and affect her driving.  We will discuss further medication changes in 6 weeks over MyChart and then have her come back in the clinic in 3 months.    A total of 25 minutes of time was spent on this encounter today.  This includes reviewing the patient's records, face-to-face time, documentation.       Problems Addressed this Visit    None  Visit Diagnoses     Generalized epilepsy (HCC)    -  Primary    Relevant Medications    lamoTRIgine (LaMICtal) 100 MG tablet    lacosamide (VIMPAT) 50 MG tablet tablet    levETIRAcetam (KEPPRA) 500 MG tablet      Diagnoses       Codes Comments    Generalized epilepsy (HCC)    -  Primary ICD-10-CM: G40.309  ICD-9-CM: 345.90

## 2022-11-30 NOTE — PATIENT INSTRUCTIONS
[facility]  Eileen Vidales MD  Neurology clinic  [phone]    With anti-seizure medications, you may initially notice side effects of fatigue, drowsiness, unsteadiness, and dizziness.  Other possible side effects include nausea, abdominal pain, headache, blurry or double vision, slurred speech and mood changes.  Generally, patients will noticed these symptoms when the medication is first started or with higher doses and will go away with time.    It is import to consistently take your medication every day.  Missing just one dose may put you at risk for a breakthrough seizure.  Consider using reminders on your phone or a pill box.    If you develop a rash, please call the neurology clinic immediately or notify another healthcare professional, as this may be potentially life-threatening.  If you are unable to reach a healthcare professional, go to the emergency room immediately for further evaluation.    If you develop thoughts of wanting to hurt yourself or others, please call the neurology clinic immediately to notify another healthcare professional.  If you are unable to reach a healthcare professional, go to the emergency room immediately for further evaluation.    Taking anti-seizure medications may increase the risk of birth defects.  If you are a female of child-bearing potential, it is recommended that you take folic acid (1-4 mg) daily.  This may reduce the risk of birth defects while pregnant and taking seizure medication.  If you become pregnant, contact our office immediately. You will need to be followed very closely (at least monthly appointments).  I also recommend contacting The North American Antiepileptic Drug Pregnancy Registry at www.aedpregnancyregistry.org or 1-119.758.8084.    It is the [place] state law that you cannot drive within 90 days of a seizure.    You should avoid certain activities that if you were to have a seizure, you could harm yourself or others. In  general, it is recommended that you avoid operating heavy machinery or power tools, swimming or taking baths by yourself (showers are ok), don't stand over open flames, don't get on high ladders or the roof.  I also recommend to avoid sleeping on your stomach.    For further information on epilepsy and resources available to patients and their families, please visit the Epilepsy Foundation [facility] at www.[email or web address] or call [phone].    **Check out the Epilepsy Foundation [facility]'s monthly Art Group Gathering.  They are [facility] at [facility] [place], [address] [place] [address].  Call Ivis Boswell at [phone] or email her at [email or web address] for the dates of future gatherings.**      **If you have having memory problems, consider HOBSCOTCH (Home-Based Self-management and Cognitive Training Changes lives).  It is an 8 week self-management program for adults with epilepsy and memory problems.  The program is free at the Epilepsy [facility].  Contact Adali Martell at [phone] or [email or web address].**    Check out My Epilepsy Story ([email or web address]).  Their goal is to foster the growth of young girls and women affected by epilepsy and encourage them not just to lives, but to THRIVE!  You can get involved by donating, sharing your story, or becoming an ambassador.  Services include educational resources and transportation.          **Consider calling the Epilepsy Foundation's 24/7 helpline if you have questions:  1-411.689.8320

## 2022-12-09 ENCOUNTER — TELEPHONE (OUTPATIENT)
Dept: NEUROLOGY | Facility: CLINIC | Age: 32
End: 2022-12-09

## 2022-12-09 DIAGNOSIS — G40.309 GENERALIZED EPILEPSY: ICD-10-CM

## 2022-12-09 RX ORDER — LEVETIRACETAM 500 MG/1
500 TABLET ORAL 2 TIMES DAILY
Qty: 60 TABLET | Refills: 11 | Status: SHIPPED | OUTPATIENT
Start: 2022-12-09 | End: 2023-01-08

## 2022-12-09 RX ORDER — LACOSAMIDE 50 MG/1
50 TABLET ORAL DAILY
Qty: 30 TABLET | Refills: 5 | Status: SHIPPED | OUTPATIENT
Start: 2022-12-09 | End: 2023-01-20

## 2022-12-09 RX ORDER — LACOSAMIDE 100 MG/1
100 TABLET ORAL EVERY 12 HOURS SCHEDULED
Qty: 60 TABLET | Refills: 5 | Status: SHIPPED | OUTPATIENT
Start: 2022-12-09 | End: 2023-01-20

## 2022-12-09 RX ORDER — FOLIC ACID 1 MG/1
1 TABLET ORAL DAILY
Qty: 30 TABLET | Refills: 11 | Status: SHIPPED | OUTPATIENT
Start: 2022-12-09

## 2022-12-09 RX ORDER — LAMOTRIGINE 100 MG/1
TABLET ORAL
Qty: 90 TABLET | Refills: 11 | Status: SHIPPED | OUTPATIENT
Start: 2022-12-09 | End: 2023-01-20

## 2022-12-12 ENCOUNTER — TELEPHONE (OUTPATIENT)
Dept: NEUROLOGY | Facility: CLINIC | Age: 32
End: 2022-12-12

## 2022-12-15 NOTE — TELEPHONE ENCOUNTER
SPOKE WITH TIARRA AT [facility] PA DEPT AND SHE STATES THAT PA IS STILL IN REVIEW- THEY ARE IN A BACK LOG OF REVIEWS AND WILL GET TO IT WITHIN 63 HOURS    I EXPLAINED TO HER THAT THIS A SEIZURE MEDICATION THAT THE PATIENT NEEDS AND ASKED TO SPEAK TO SOMEONE.    PER TIARRA THERE IS NOT ANYONE I CAN TALK TO DIRECTLY- SHE HAS CHANGED STATUS OF REQUEST TO URGENT AND WE SHOULD GET A RESPONSE BACK IN 24 HOURS

## 2023-01-11 ENCOUNTER — PATIENT MESSAGE (OUTPATIENT)
Dept: NEUROLOGY | Facility: CLINIC | Age: 33
End: 2023-01-11
Payer: COMMERCIAL

## 2023-01-11 DIAGNOSIS — G40.309 GENERALIZED EPILEPSY: Primary | ICD-10-CM

## 2023-01-20 RX ORDER — LACOSAMIDE 150 MG/1
150 TABLET ORAL 2 TIMES DAILY
Qty: 180 TABLET | Refills: 1 | Status: SHIPPED | OUTPATIENT
Start: 2023-01-20 | End: 2023-01-23 | Stop reason: SDUPTHER

## 2023-01-20 RX ORDER — LAMOTRIGINE 100 MG/1
100 TABLET ORAL 2 TIMES DAILY
Qty: 180 TABLET | Refills: 3 | Status: SHIPPED | OUTPATIENT
Start: 2023-01-20 | End: 2023-01-23 | Stop reason: SDUPTHER

## 2023-01-20 NOTE — TELEPHONE ENCOUNTER
From: Eileen Vidales  To: Aubrie Hilton  Sent: 1/11/2023 10:22 AM EST  Subject: Medication follow up    I wanted to see how you are doing on lamotrigine 150 mg twice a day and lacosamide 150 mg in the morning and 100 mg at night. Do want to continue to decrease the lamotrigine and increase lacosamide?

## 2023-01-23 ENCOUNTER — TELEPHONE (OUTPATIENT)
Dept: NEUROLOGY | Facility: CLINIC | Age: 33
End: 2023-01-23
Payer: COMMERCIAL

## 2023-01-23 DIAGNOSIS — G40.309 GENERALIZED EPILEPSY: ICD-10-CM

## 2023-01-23 RX ORDER — LAMOTRIGINE 100 MG/1
100 TABLET ORAL 2 TIMES DAILY
Qty: 180 TABLET | Refills: 3 | Status: SHIPPED | OUTPATIENT
Start: 2023-01-23 | End: 2023-03-24

## 2023-01-23 RX ORDER — LACOSAMIDE 150 MG/1
150 TABLET ORAL 2 TIMES DAILY
Qty: 180 TABLET | Refills: 1 | Status: SHIPPED | OUTPATIENT
Start: 2023-01-23

## 2023-02-28 ENCOUNTER — OFFICE VISIT (OUTPATIENT)
Dept: NEUROLOGY | Facility: CLINIC | Age: 33
End: 2023-02-28
Payer: COMMERCIAL

## 2023-02-28 VITALS
HEIGHT: 68 IN | HEART RATE: 94 BPM | BODY MASS INDEX: 32.4 KG/M2 | OXYGEN SATURATION: 100 % | SYSTOLIC BLOOD PRESSURE: 124 MMHG | WEIGHT: 213.8 LBS | DIASTOLIC BLOOD PRESSURE: 74 MMHG

## 2023-02-28 DIAGNOSIS — G40.309 GENERALIZED EPILEPSY: Primary | ICD-10-CM

## 2023-02-28 DIAGNOSIS — F33.0 MILD RECURRENT MAJOR DEPRESSION: ICD-10-CM

## 2023-02-28 DIAGNOSIS — F41.9 ANXIETY: ICD-10-CM

## 2023-02-28 PROCEDURE — 99214 OFFICE O/P EST MOD 30 MIN: CPT | Performed by: PSYCHIATRY & NEUROLOGY

## 2023-02-28 NOTE — PROGRESS NOTES
Subjective:     Patient ID: Aubrie Hilton is a 32 y.o. female.    History of Present Illness  Patient is a 32-year-old female with a history of genetic epilepsy with primary generalized seizures who presents to the neurology clinic today as an established patient for follow-up.  The patient was last seen on November 30, 2022.  She is new patient to me back in February 2021.  Her seizures started in 2010.  In the past she was having up to 1 seizure a month.  She had gone seizure-free from August or September 2021 until February 21, 2023.  Unfortunately she had an incident at work that she had to tell her boss 5 days later that created a lot of stress for her.  She ranks it a 12 out of 10.  The last time she felt that way was in May 2020 when she got a DUI.  The patient lives with her 9-year-old daughter.  She had her seizure around 7:30 PM.  She typically takes her medicines between 7 AM and 7 PM and she is not sure if she had already taken her evening dose of her medications.  In the past she wanted to decrease her medications as much as possible because she was interested in family-planning but that is no longer goal.  She has been in an on-and-off relationship with someone.  She is currently on lamotrigine 100 mg twice a day, lacosamide 150 mg twice a day and levetiracetam 500 mg twice a day.  Higher doses of levetiracetam caused mood side effects.  The patient uses the Implanon device and is on folic acid.  She did not do well with levetiracetam extended release because she felt foggy.  Patient reports that her seizure was a generalized tonic-clonic seizure.  She does have some myoclonus with increased stress.  She reports that she does have a diagnosis of depression and anxiety and has seen a therapist in the past and takes hydroxyzine as needed.  She is not interested in changing in her medications at this time.    The following portions of the patient's history were reviewed and updated as appropriate:  allergies, current medications, past family history, past medical history, past social history, past surgical history and problem list.    Review of Systems     Objective:    Neurological Exam    Physical Exam    Assessment/Plan:    The patient is a 32-year-old female with a history of genetic epilepsy who presents today for follow-up.    1.  Epilepsy-fortunately the patient was seizure-free between September 2021 and February 2023.  She thinks that it may have been provoked by extreme stress.  The patient is not interested in changing her medications which is reasonable.  If she continues to have problems with myoclonus we may want to try switching her levetiracetam to Briviact.  We discussed trying to keep hydrated, decreasing caffeine, decreasing alcohol, decreasing stress and making sure she gets plenty of sleep.  She is also interested in seeing a psychiatrist.  We could also consider switching her lamotrigine to extended release.  We reviewed routine seizure precautions including but not limited to not driving within 90 days of a seizure.  I will see the patient back in 3 months time or sooner if needed.    A total of 30 minutes of time was spent on this encounter today.  This includes reviewing the patient's records, face-to-face time, documentation.       Problems Addressed this Visit    None  Visit Diagnoses     Generalized epilepsy (HCC)    -  Primary    Mild recurrent major depression (HCC)        Relevant Orders    Ambulatory Referral to Psychiatry    Anxiety        Relevant Orders    Ambulatory Referral to Psychiatry      Diagnoses       Codes Comments    Generalized epilepsy (HCC)    -  Primary ICD-10-CM: G40.309  ICD-9-CM: 345.90     Mild recurrent major depression (HCC)     ICD-10-CM: F33.0  ICD-9-CM: 296.31     Anxiety     ICD-10-CM: F41.9  ICD-9-CM: 300.00

## 2023-02-28 NOTE — PATIENT INSTRUCTIONS
[facility]  Eileen Vidales MD  Neurology clinic  [phone]    With anti-seizure medications, you may initially notice side effects of fatigue, drowsiness, unsteadiness, and dizziness.  Other possible side effects include nausea, abdominal pain, headache, blurry or double vision, slurred speech and mood changes.  Generally, patients will noticed these symptoms when the medication is first started or with higher doses and will go away with time.    It is import to consistently take your medication every day.  Missing just one dose may put you at risk for a breakthrough seizure.  Consider using reminders on your phone or a pill box.    If you develop a rash, please call the neurology clinic immediately or notify another healthcare professional, as this may be potentially life-threatening.  If you are unable to reach a healthcare professional, go to the emergency room immediately for further evaluation.    If you develop thoughts of wanting to hurt yourself or others, please call the neurology clinic immediately to notify another healthcare professional.  If you are unable to reach a healthcare professional, go to the emergency room immediately for further evaluation.    Taking anti-seizure medications may increase the risk of birth defects.  If you are a female of child-bearing potential, it is recommended that you take folic acid (1-4 mg) daily.  This may reduce the risk of birth defects while pregnant and taking seizure medication.  If you become pregnant, contact our office immediately. You will need to be followed very closely (at least monthly appointments).  I also recommend contacting The North American Antiepileptic Drug Pregnancy Registry at www.aedpregnancyregistry.org or 1-424.202.7182.    It is the [place] state law that you cannot drive within 90 days of a seizure.    You should avoid certain activities that if you were to have a seizure, you could harm yourself or others. In  general, it is recommended that you avoid operating heavy machinery or power tools, swimming or taking baths by yourself (showers are ok), don't stand over open flames, don't get on high ladders or the roof.  I also recommend to avoid sleeping on your stomach.    For further information on epilepsy and resources available to patients and their families, please visit the Epilepsy Foundation [facility] at www.efky.org or call [phone].    **Check out the Epilepsy Foundation [facility]'s monthly Art Group Gathering.  They are located at [facility] [place], [address] [place] [address].  Call Ivis Elbert at [phone] or email her at [email or web address] for the dates of future gatherings.**      **If you have having memory problems, consider HOBSCOTCH (Home-Based Self-management and Cognitive Training Changes lives).  It is an 8 week self-management program for adults with epilepsy and memory problems.  The program is free at the Epilepsy [facility].  Contact Adali Martell at [phone] or [email or web address].**    Check out My Epilepsy Story ([email or web address]).  Their goal is to foster the growth of young girls and women affected by epilepsy and encourage them not just to lives, but to THRIVE!  You can get involved by donating, sharing your story, or becoming an ambassador.  Services include educational resources and transportation.          **Check out their seizure first aid training and certification courses.    **Consider calling the Epilepsy Foundation's 24/7 helpline if you have questions:  1-121.480.4774      **If you are interested in the Ketogenic Diet, check out [email or web address].      **Consider seizure monitoring wrist-worn wearable devices.  You can download apps to your Apple Watch like Pango or purchase the [employer] device.

## 2023-07-31 DIAGNOSIS — G40.309 GENERALIZED EPILEPSY: ICD-10-CM

## 2023-08-01 RX ORDER — LACOSAMIDE 150 MG/1
TABLET ORAL
Qty: 60 TABLET | Refills: 5 | Status: SHIPPED | OUTPATIENT
Start: 2023-08-01

## 2023-08-09 ENCOUNTER — PATIENT MESSAGE (OUTPATIENT)
Dept: NEUROLOGY | Facility: CLINIC | Age: 33
End: 2023-08-09
Payer: COMMERCIAL

## 2023-08-09 DIAGNOSIS — G40.309 GENERALIZED EPILEPSY: ICD-10-CM

## 2023-08-09 NOTE — TELEPHONE ENCOUNTER
From: Cathy MALONEY  To: Aubrie Hilton  Sent: 8/9/2023 8:30 AM EDT  Subject: seizure    I wanted to follow up with you regarding your message regarding seizure thru the appointment request line    When did you have the seizure, how long did it last, have you been ill, have you missed any doses of your medication, has the shape,size or color of your medication changed since your last visit, were you injured during the seizure. Please describe the seizure as well

## 2023-08-11 RX ORDER — LACOSAMIDE 200 MG/1
200 TABLET ORAL EVERY MORNING
Qty: 90 TABLET | Refills: 1 | Status: SHIPPED | OUTPATIENT
Start: 2023-08-11

## 2023-08-11 RX ORDER — LACOSAMIDE 150 MG/1
150 TABLET ORAL NIGHTLY
Qty: 90 TABLET | Refills: 1 | Status: SHIPPED | OUTPATIENT
Start: 2023-08-11

## 2023-09-10 ENCOUNTER — PATIENT MESSAGE (OUTPATIENT)
Dept: NEUROLOGY | Facility: CLINIC | Age: 33
End: 2023-09-10
Payer: COMMERCIAL

## 2023-09-11 NOTE — TELEPHONE ENCOUNTER
From: Aubrie Hilton  To: Eileen Vidales  Sent: 9/10/2023 6:35 PM EDT  Subject: Another seizure    Yesterday I had ANOTHER seizure. I am absolutely heartbroken about it. I'm not sure why they won't stop. I called the psychiatrist that I was referred to and they currently have a wait list and they are unsure how long I will be on it. I have also been having bad migraines especially after my seizures. I know we will discuss this on the 20th, but I just wanted to fill you in.

## 2023-09-20 ENCOUNTER — OFFICE VISIT (OUTPATIENT)
Dept: NEUROLOGY | Facility: CLINIC | Age: 33
End: 2023-09-20
Payer: COMMERCIAL

## 2023-09-20 ENCOUNTER — TELEPHONE (OUTPATIENT)
Dept: NEUROLOGY | Facility: CLINIC | Age: 33
End: 2023-09-20

## 2023-09-20 VITALS
BODY MASS INDEX: 33.25 KG/M2 | HEIGHT: 68 IN | OXYGEN SATURATION: 98 % | DIASTOLIC BLOOD PRESSURE: 74 MMHG | WEIGHT: 219.4 LBS | SYSTOLIC BLOOD PRESSURE: 124 MMHG | HEART RATE: 80 BPM

## 2023-09-20 DIAGNOSIS — G40.309 GENERALIZED EPILEPSY: Primary | ICD-10-CM

## 2023-09-20 DIAGNOSIS — R56.9 CONVULSIONS, UNSPECIFIED CONVULSION TYPE: ICD-10-CM

## 2023-09-20 PROCEDURE — 99214 OFFICE O/P EST MOD 30 MIN: CPT | Performed by: PSYCHIATRY & NEUROLOGY

## 2023-09-20 RX ORDER — MULTIPLE VITAMINS W/ MINERALS TAB 9MG-400MCG
1 TAB ORAL DAILY
COMMUNITY

## 2023-09-20 RX ORDER — CLONAZEPAM 0.25 MG/1
0.25 TABLET, ORALLY DISINTEGRATING ORAL DAILY PRN
Qty: 10 TABLET | Refills: 1 | Status: SHIPPED | OUTPATIENT
Start: 2023-09-20

## 2023-09-20 NOTE — LETTER
September 20, 2023     Aubrieflorence Hilton    Patient: Aubrie Hilton   YOB: 1990   Date of Visit: 9/20/2023     Dear Aubrie Hilton:       Thank you for referring Aubrie Hilton to me for evaluation. Below are the relevant portions of my assessment and plan of care.    If you have questions, please do not hesitate to call me. I look forward to following Aubrie along with you.         Sincerely,        Eileen Vidales MD        CC: No Recipients    Eileen Vidales MD  09/20/23 1411  Sign when Signing Visit  Subjective:     Patient ID: Aubrie Hilton is a 32 y.o. female.    History of Present Illness  The patient is a 32-year-old female with a history of genetic epilepsy with primary generalized seizures who presents to the neurology clinic today as an established patient for follow-up.  The patient was last seen on February 28, 2023.  She was a new patient to me back in February 2021.  Her seizures started in 2010.  In the past she did have up to 1 seizure per month.  She went seizure-free from around September 2021 until February 2023.  Since then she has had 4 seizures.  She had 1 on August 8, 1 on August 30 and then on September 9.  She thinks these are due to stress.  She is currently on lamotrigine 100 mg twice a day, lacosamide 200 mg the morning and 150 mg at night, and levetiracetam 500 mg twice a day.  Higher doses of levetiracetam caused mood side effects.  The patient has the Implanon device for contraception and is on folic acid.  She did not do well with extended release levetiracetam.  The patient also has intermittent myoclonus.  She is awaiting an appointment with psychiatry.  She would like to defer seeing a psychologist due to financial reasons.  The following portions of the patient's history were reviewed and updated as appropriate: allergies, current medications, past family history, past medical history, past social history, past surgical history, and  problem list.    Review of Systems     Objective:    Neurological Exam    Physical Exam    Assessment/Plan:    The patient is a 32-year-old female with history of genetic epilepsy with primary generalized seizures who presents today for follow-up.    1.  Epilepsy-the patient was seizure-free from September 2021 until February 2023.  She was then seizure-free from February until August but then has had 3 seizures since.  We did increase her morning dose of lacosamide but she continue have 2 more seizures.  We decided today to switch her levetiracetam to Briviact.  She was given a week sample as well as our coupon card.  She was also given clonazepam as needed.  We will see her back in 2 months or sooner if needed.    A total of 30 minutes of time was spent on this encounter today.  This includes reviewing patient's records, face-to-face time, documentation.       Problems Addressed this Visit          Neuro    Attack, epileptiform    Relevant Medications    clonazePAM (KlonoPIN) 0.25 MG disintegrating tablet    brivaracetam (BRIVIACT) 50 MG tablet     Other Visit Diagnoses       Generalized epilepsy    -  Primary    Relevant Medications    clonazePAM (KlonoPIN) 0.25 MG disintegrating tablet    brivaracetam (BRIVIACT) 50 MG tablet          Diagnoses         Codes Comments    Generalized epilepsy    -  Primary ICD-10-CM: G40.309  ICD-9-CM: 345.90     Convulsions, unspecified convulsion type     ICD-10-CM: R56.9  ICD-9-CM: 780.39

## 2023-09-20 NOTE — PROGRESS NOTES
Subjective:     Patient ID: Aubrie Hilton is a 32 y.o. female.    History of Present Illness  The patient is a 32-year-old female with a history of genetic epilepsy with primary generalized seizures who presents to the neurology clinic today as an established patient for follow-up.  The patient was last seen on February 28, 2023.  She was a new patient to me back in February 2021.  Her seizures started in 2010.  In the past she did have up to 1 seizure per month.  She went seizure-free from around September 2021 until February 2023.  Since then she has had 4 seizures.  She had 1 on August 8, 1 on August 30 and then on September 9.  She thinks these are due to stress.  She is currently on lamotrigine 100 mg twice a day, lacosamide 200 mg the morning and 150 mg at night, and levetiracetam 500 mg twice a day.  Higher doses of levetiracetam caused mood side effects.  The patient has the Implanon device for contraception and is on folic acid.  She did not do well with extended release levetiracetam.  The patient also has intermittent myoclonus.  She is awaiting an appointment with psychiatry.  She would like to defer seeing a psychologist due to financial reasons.  The following portions of the patient's history were reviewed and updated as appropriate: allergies, current medications, past family history, past medical history, past social history, past surgical history, and problem list.    Review of Systems     Objective:    Neurological Exam    Physical Exam    Assessment/Plan:    The patient is a 32-year-old female with history of genetic epilepsy with primary generalized seizures who presents today for follow-up.    1.  Epilepsy-the patient was seizure-free from September 2021 until February 2023.  She was then seizure-free from February until August but then has had 3 seizures since.  We did increase her morning dose of lacosamide but she continue have 2 more seizures.  We decided today to switch her  levetiracetam to Briviact.  She was given a week sample as well as our coupon card.  She was also given clonazepam as needed.  We will see her back in 2 months or sooner if needed.    A total of 30 minutes of time was spent on this encounter today.  This includes reviewing patient's records, face-to-face time, documentation.       Problems Addressed this Visit          Neuro    Attack, epileptiform    Relevant Medications    clonazePAM (KlonoPIN) 0.25 MG disintegrating tablet    brivaracetam (BRIVIACT) 50 MG tablet     Other Visit Diagnoses       Generalized epilepsy    -  Primary    Relevant Medications    clonazePAM (KlonoPIN) 0.25 MG disintegrating tablet    brivaracetam (BRIVIACT) 50 MG tablet          Diagnoses         Codes Comments    Generalized epilepsy    -  Primary ICD-10-CM: G40.309  ICD-9-CM: 345.90     Convulsions, unspecified convulsion type     ICD-10-CM: R56.9  ICD-9-CM: 780.39

## 2023-09-20 NOTE — PATIENT INSTRUCTIONS
[facility]  Eileen Vidales MD  Neurology clinic  [phone]    With anti-seizure medications, you may initially notice side effects of fatigue, drowsiness, unsteadiness, and dizziness.  Other possible side effects include nausea, abdominal pain, headache, blurry or double vision, slurred speech and mood changes.  Generally, patients will noticed these symptoms when the medication is first started or with higher doses and will go away with time.    It is import to consistently take your medication every day.  Missing just one dose may put you at risk for a breakthrough seizure.  Consider using reminders on your phone or a pill box.    If you develop a rash, please call the neurology clinic immediately or notify another healthcare professional, as this may be potentially life-threatening.  If you are unable to reach a healthcare professional, go to the emergency room immediately for further evaluation.    If you develop thoughts of wanting to hurt yourself or others, please call the neurology clinic immediately to notify another healthcare professional.  If you are unable to reach a healthcare professional, go to the emergency room immediately for further evaluation.    Taking anti-seizure medications may increase the risk of birth defects.  If you are a female of child-bearing potential, it is recommended that you take folic acid (1-4 mg) daily.  This may reduce the risk of birth defects while pregnant and taking seizure medication.  If you become pregnant, contact our office immediately. You will need to be followed very closely (at least monthly appointments).  I also recommend contacting The North American Antiepileptic Drug Pregnancy Registry at www.aedpregnancyregistry.org or 1-673.755.4257.    It is the [place] state law that you cannot drive within 90 days of a seizure.    You should avoid certain activities that if you were to have a seizure, you could harm yourself or others. In  general, it is recommended that you avoid operating heavy machinery or power tools, swimming or taking baths by yourself (showers are ok), don't stand over open flames, don't get on high ladders or the roof.  I also recommend to avoid sleeping on your stomach.    For further information on epilepsy and resources available to patients and their families, please visit the Epilepsy Foundation [facility] at www.efky.org or call [phone].    **Check out the Epilepsy Foundation [facility]'s monthly Art Group Gathering.  They are located at [facility] [place], [address] [place] [address].  Call Ivis Elbert at [phone] or email her at [email or web address] for the dates of future gatherings.**      **If you have having memory problems, consider HOBSCOTCH (Home-Based Self-management and Cognitive Training Changes lives).  It is an 8 week self-management program for adults with epilepsy and memory problems.  The program is free at the Epilepsy [facility].  Contact Adali Martell at [phone] or [email or web address].**    Check out My Epilepsy Story (MyEpilepsyStory.org).  Their goal is to foster the growth of young girls and women affected by epilepsy and encourage them not just to lives, but to THRIVE!  You can get involved by donating, sharing your story, or becoming an ambassador.  Services include educational resources and transportation.          **Check out their seizure first aid training and certification courses.    **Consider calling the Epilepsy Foundation's 24/7 helpline if you have questions:  1-677.626.3744      **If you are interested in the Ketogenic Diet, check out [email or web address].      **Consider seizure monitoring wrist-worn wearable devices.  You can download apps to your Apple Watch like [employer]Cell or purchase the [employer] device.

## 2023-11-21 ENCOUNTER — TELEPHONE (OUTPATIENT)
Dept: NEUROLOGY | Facility: CLINIC | Age: 33
End: 2023-11-21
Payer: COMMERCIAL

## 2023-11-21 NOTE — TELEPHONE ENCOUNTER
PA SUBMITTED FOR LAMOTRIGINE as per fax rec'd from [facility], however, per [facility] they ran thru for 90 day supply while I was on phone and PA not required, they are not sure why that was sent to us from [facility]

## 2023-11-28 ENCOUNTER — OFFICE VISIT (OUTPATIENT)
Dept: NEUROLOGY | Facility: CLINIC | Age: 33
End: 2023-11-28
Payer: COMMERCIAL

## 2023-11-28 VITALS
HEART RATE: 80 BPM | BODY MASS INDEX: 33.95 KG/M2 | WEIGHT: 224 LBS | HEIGHT: 68 IN | DIASTOLIC BLOOD PRESSURE: 76 MMHG | SYSTOLIC BLOOD PRESSURE: 124 MMHG | OXYGEN SATURATION: 98 %

## 2023-11-28 DIAGNOSIS — G40.309 GENERALIZED EPILEPSY: Primary | ICD-10-CM

## 2023-11-28 PROCEDURE — 99214 OFFICE O/P EST MOD 30 MIN: CPT | Performed by: PSYCHIATRY & NEUROLOGY

## 2023-11-28 RX ORDER — VENLAFAXINE HYDROCHLORIDE 75 MG/1
CAPSULE, EXTENDED RELEASE ORAL
COMMUNITY
Start: 2023-11-09

## 2023-11-28 NOTE — PROGRESS NOTES
Subjective:     Patient ID: Aubrie Hilton is a 33 y.o. female.    History of Present Illness  The patient is a 33-year-old female with a history of genetic epilepsy with primary generalized seizures who presents to the neurology clinic today as established patient for follow-up.  The patient was last seen on September 20, 2023.  She was a new patient to me back in 2021.  Her seizures started in 2010.  In the past she would have up to 1 seizure per month but then she went seizure-free between September 2021 and February 2023.  Since then she has had 4 seizures.  Her last seizure was on September 9.  She thinks that these have been due to stress.  She is currently on lamotrigine 100 mg twice a day, lacosamide 200 mg in the morning and 150 mg at night, and Briviact 50 mg twice a day.  This was substituted for levetiracetam 500 mg twice a day.  She denies any seizures since her last visit but does feel like she is twitching more.  This was after the Briviact switch but around the time she was fired and when Effexor was started.  She denies any more brain fog.  She did drive today's appointment.  She did leave a stressful job.  She has seen a psychiatrist and her psychologist appointment is pending.  She does report that she is having twitching almost every day lasting about a minute when she lays down to sleep.  This could happen in the afternoon with a nap or at night at bedtime.  Involves her whole body but used to only be her fingers and feet.  She takes her medications around 8 in the morning and 8 at night.  She also has clonazepam as needed.  Because stress this seemed to trigger her seizures in the past she has been using it as needed for stress.  In October she used it 2-3 times in November she used it 4 times.    The following portions of the patient's history were reviewed and updated as appropriate: allergies, current medications, past family history, past medical history, past social history, past  surgical history, and problem list.    Review of Systems     Objective:    Neurological Exam    Physical Exam    Assessment/Plan:    The patient is a 33-year-old female with a history of genetic epilepsy with primary generalized seizures who presents today for follow-up.    1.  Seizures-fortunately the patient has not had a seizure since September.  We did review routine seizure precautions including but not limited to not driving within 90 days of a seizure.  It sounds like her myoclonus is increased.  I wonder if this is due to the Effexor.  We discussed options of discussing with her psychiatrist possibly lowering the dose or switching to an alternative, decreasing her lamotrigine, or increasing her Briviact.  The patient was open to increasing the Briviact.  She was on commercial insurance and was using the coupon card but will be switching to [place] Medicaid.  I am concerned about coverage of her medication with her insurance switch.  She is can to let us know if the cost of the medication changes for her.    A total of 30 minutes of time was spent on encounter today.  This includes reviewing the patient's records, face-to-face time, documentation.       Problems Addressed this Visit    None  Visit Diagnoses       Generalized epilepsy    -  Primary    Relevant Medications    brivaracetam (Briviact) 75 MG tablet    Other Relevant Orders    Ambulatory Referral to Neurology          Diagnoses         Codes Comments    Generalized epilepsy    -  Primary ICD-10-CM: G40.309  ICD-9-CM: 345.90

## 2023-11-28 NOTE — LETTER
November 28, 2023     Aubrie Hilton    Patient: Aubrie Hilton   YOB: 1990   Date of Visit: 11/28/2023     Dear Aubrie Hilton:       Thank you for referring Aubrie Hilton to me for evaluation. Below are the relevant portions of my assessment and plan of care.    If you have questions, please do not hesitate to call me. I look forward to following Aubrie along with you.         Sincerely,        Eileen Vidales MD        CC: No Recipients    Eileen Vidales MD  11/28/23 1507  Sign when Signing Visit  Subjective:     Patient ID: Aubrie Hilton is a 33 y.o. female.    History of Present Illness  The patient is a 33-year-old female with a history of genetic epilepsy with primary generalized seizures who presents to the neurology clinic today as established patient for follow-up.  The patient was last seen on September 20, 2023.  She was a new patient to me back in 2021.  Her seizures started in 2010.  In the past she would have up to 1 seizure per month but then she went seizure-free between September 2021 and February 2023.  Since then she has had 4 seizures.  Her last seizure was on September 9.  She thinks that these have been due to stress.  She is currently on lamotrigine 100 mg twice a day, lacosamide 200 mg in the morning and 150 mg at night, and Briviact 50 mg twice a day.  This was substituted for levetiracetam 500 mg twice a day.  She denies any seizures since her last visit but does feel like she is twitching more.  This was after the Briviact switch but around the time she was fired and when Effexor was started.  She denies any more brain fog.  She did drive today's appointment.  She did leave a stressful job.  She has seen a psychiatrist and her psychologist appointment is pending.  She does report that she is having twitching almost every day lasting about a minute when she lays down to sleep.  This could happen in the afternoon with a nap or at night at  bedtime.  Involves her whole body but used to only be her fingers and feet.  She takes her medications around 8 in the morning and 8 at night.  She also has clonazepam as needed.  Because stress this seemed to trigger her seizures in the past she has been using it as needed for stress.  In October she used it 2-3 times in November she used it 4 times.    The following portions of the patient's history were reviewed and updated as appropriate: allergies, current medications, past family history, past medical history, past social history, past surgical history, and problem list.    Review of Systems     Objective:    Neurological Exam    Physical Exam    Assessment/Plan:    The patient is a 33-year-old female with a history of genetic epilepsy with primary generalized seizures who presents today for follow-up.    1.  Seizures-fortunately the patient has not had a seizure since September.  We did review routine seizure precautions including but not limited to not driving within 90 days of a seizure.  It sounds like her myoclonus is increased.  I wonder if this is due to the Effexor.  We discussed options of discussing with her psychiatrist possibly lowering the dose or switching to an alternative, decreasing her lamotrigine, or increasing her Briviact.  The patient was open to increasing the Briviact.  She was on commercial insurance and was using the coupon card but will be switching to [place] Medicaid.  I am concerned about coverage of her medication with her insurance switch.  She is can to let us know if the cost of the medication changes for her.    A total of 30 minutes of time was spent on encounter today.  This includes reviewing the patient's records, face-to-face time, documentation.       Problems Addressed this Visit    None  Visit Diagnoses       Generalized epilepsy    -  Primary    Relevant Medications    brivaracetam (Briviact) 75 MG tablet    Other Relevant Orders    Ambulatory Referral to Neurology           Diagnoses         Codes Comments    Generalized epilepsy    -  Primary ICD-10-CM: G40.309  ICD-9-CM: 345.90

## 2023-11-28 NOTE — PATIENT INSTRUCTIONS
[facility]  Eileen Vidales MD  Neurology clinic  [phone]    With anti-seizure medications, you may initially notice side effects of fatigue, drowsiness, unsteadiness, and dizziness.  Other possible side effects include nausea, abdominal pain, headache, blurry or double vision, slurred speech and mood changes.  Generally, patients will noticed these symptoms when the medication is first started or with higher doses and will go away with time.    It is import to consistently take your medication every day.  Missing just one dose may put you at risk for a breakthrough seizure.  Consider using reminders on your phone or a pill box.    If you develop a rash, please call the neurology clinic immediately or notify another healthcare professional, as this may be potentially life-threatening.  If you are unable to reach a healthcare professional, go to the emergency room immediately for further evaluation.    If you develop thoughts of wanting to hurt yourself or others, please call the neurology clinic immediately to notify another healthcare professional.  If you are unable to reach a healthcare professional, go to the emergency room immediately for further evaluation.    Taking anti-seizure medications may increase the risk of birth defects.  If you are a female of child-bearing potential, it is recommended that you take folic acid (1-4 mg) daily.  This may reduce the risk of birth defects while pregnant and taking seizure medication.  If you become pregnant, contact our office immediately. You will need to be followed very closely (at least monthly appointments).  I also recommend contacting The North American Antiepileptic Drug Pregnancy Registry at www.aedpregnancyregistry.org or 1-883.735.2804.    It is the [place] state law that you cannot drive within 90 days of a seizure.    You should avoid certain activities that if you were to have a seizure, you could harm yourself or others. In  general, it is recommended that you avoid operating heavy machinery or power tools, swimming or taking baths by yourself (showers are ok), don't stand over open flames, don't get on high ladders or the roof.  I also recommend to avoid sleeping on your stomach.    For further information on epilepsy and resources available to patients and their families, please visit the [facility] at www.efky.org or call [phone].    **Check out the [facility]'s monthly Art Group Gathering.  They are located at [facility], [address] [place] [address].  Call Ivis Elbert at [phone] or email her at [email or web address] for the dates of future gatherings.**      **If you have having memory problems, consider HOBSCOTCH (Home-Based Self-management and Cognitive Training Changes lives).  It is an 8 week self-management program for adults with epilepsy and memory problems.  The program is free at the [facility].  Contact Adali Martell at [phone] or [email or web address].**    Check out My Epilepsy Story (MyEpilepsyStory.org).  Their goal is to foster the growth of young girls and women affected by epilepsy and encourage them not just to lives, but to THRIVE!  You can get involved by donating, sharing your story, or becoming an ambassador.  Services include educational resources and transportation.          **Check out their seizure first aid training and certification courses.    **Consider calling the Epilepsy Foundation's 24/7 helpline if you have questions:  1-756.832.5241      **If you are interested in the Ketogenic Diet, check out [email or web address].      **Consider seizure monitoring wrist-worn wearable devices.  You can download apps to your Apple Watch like [employer] or purchase the BitInstant device.

## 2023-12-07 ENCOUNTER — PATIENT MESSAGE (OUTPATIENT)
Dept: NEUROLOGY | Facility: CLINIC | Age: 33
End: 2023-12-07
Payer: MEDICAID

## 2023-12-15 ENCOUNTER — TELEPHONE (OUTPATIENT)
Dept: NEUROLOGY | Facility: CLINIC | Age: 33
End: 2023-12-15
Payer: MEDICAID

## 2023-12-15 NOTE — TELEPHONE ENCOUNTER
Recd approval      Approvedtoday  [place] Case: [id number], Status: Approved, Coverage Starts on: 12/15/2023 12:00:00 AM, Coverage Ends on: 12/14/2024 12:00:00 AM.

## 2023-12-18 RX ORDER — FOLIC ACID 1 MG/1
1000 TABLET ORAL DAILY
Qty: 30 TABLET | Refills: 4 | Status: SHIPPED | OUTPATIENT
Start: 2023-12-18

## 2024-02-11 DIAGNOSIS — G40.309 GENERALIZED EPILEPSY: ICD-10-CM

## 2024-02-12 RX ORDER — LACOSAMIDE 150 MG/1
1 TABLET ORAL NIGHTLY
Qty: 90 TABLET | Refills: 1 | Status: SHIPPED | OUTPATIENT
Start: 2024-02-12

## 2024-02-13 DIAGNOSIS — G40.309 GENERALIZED EPILEPSY: ICD-10-CM

## 2024-02-14 RX ORDER — LACOSAMIDE 200 MG/1
200 TABLET ORAL EVERY MORNING
Qty: 90 TABLET | Refills: 1 | Status: SHIPPED | OUTPATIENT
Start: 2024-02-14

## 2024-03-15 DIAGNOSIS — G40.309 GENERALIZED EPILEPSY: ICD-10-CM

## 2024-03-15 NOTE — TELEPHONE ENCOUNTER
Caller: Aubrie Hilton ESTEBAN    Relationship: Self    Best call back number: [phone]     Requested Prescriptions:   Requested Prescriptions     Pending Prescriptions Disp Refills    brivaracetam (Briviact) 75 MG tablet 180 tablet 1     Sig: Take 1 tablet by mouth 2 (Two) Times a Day.        Pharmacy where request should be sent: [facility] [address] [place] [address] [address]  - [phone]  - [phone]     Last office visit with prescribing clinician: Visit date not found   Last telemedicine visit with prescribing clinician: Visit date not found   Next office visit with prescribing clinician: 6/5/2024     Additional details provided by patient: N/A    Does the patient have less than a 3 day supply:  [x] Yes  [] No    Would you like a call back once the refill request has been completed: [] Yes [x] No    If the office needs to give you a call back, can they leave a voicemail: [] Yes [x] No    Melanie Stanford   03/15/24 14:01 EDT

## 2024-03-18 NOTE — TELEPHONE ENCOUNTER
Called pharmacy to see why they are not able to fill patients prescription- was notified that patients ANDREA and Address need to be from [place] as patient [place]. I vu      Reaching out to pts new Neurologist Nohelia Rush to see if they would be willing to fill pts rx until she is seen in June? Or be willing to move up her appointment?     Dr. Vidales is no longer practicing     Pt is out of meds.

## 2024-03-18 NOTE — TELEPHONE ENCOUNTER
Caller: Aubrie Hilton    Relationship: Self    Best call back number: [phone]    What was the call regarding: PATIENT CALLED AND STATED THAT SHE IS OUT OF HER MEDICATION AND HAD A SZ ON SATURDAY. THE PATIENT TRIED TO HAVE HER PCP REFILL IT BUT THEY INFORMED HER THAT SINCE IT IS A CONTROLED SUBSTANCE THEY ARE NOT ABLE TO SEND IT IN UNTIL SHE SEES ROLDAN FINE.     PATIENT ASKS IF THERE IS A WAY THAT HER RX COULD BE SENT TO PHARMACY OR IF THE OFFICE COULD ADVISE AS TO WHO SHE COULD HAVE FILL THIS MEDICATION.     PLEASE REVIEW  THANK YOU

## 2024-04-17 DIAGNOSIS — G40.309 GENERALIZED EPILEPSY: ICD-10-CM

## 2024-04-17 RX ORDER — LACOSAMIDE 150 MG/1
1 TABLET ORAL NIGHTLY
Qty: 90 TABLET | Refills: 1 | Status: SHIPPED | OUTPATIENT
Start: 2024-04-17

## 2024-04-17 NOTE — TELEPHONE ENCOUNTER
Caller: Aubrie Hilotn ESTEBAN    Relationship: Self    Best call back number: [phone]    Requested Prescriptions:   Requested Prescriptions     Pending Prescriptions Disp Refills    Lacosamide 150 MG tablet 90 tablet 1     Sig: Take 1 tablet by mouth Every Night.        Pharmacy where request should be sent: [facility] [address] [place] [address] [address] - [phone]  - [phone]     Last office visit with prescribing clinician: Visit date not found   Last telemedicine visit with prescribing clinician: Visit date not found   Next office visit with prescribing clinician: 5/6/2024     Additional details provided by patient: PATIENT TELEPHONED TO REQUEST RX REFILL AS SHE NOW HAS [place] MEDICAID & COMPLETELY OUT OF THIS MEDICATION. SHE WILL BE A NP TO [facility] ON 5/6/24.    PLEASE FILL RX OR CALL TO ADVISE-THANK YOU     Does the patient have less than a 3 day supply:  [x] Yes  [] No    Would you like a call back once the refill request has been completed: [x] Yes [] No    If the office needs to give you a call back, can they leave a voicemail: [x] Yes [] No    Melanie Stanford Rep   04/17/24 11:10 EDT

## 2024-05-06 ENCOUNTER — OFFICE VISIT (OUTPATIENT)
Dept: NEUROLOGY | Facility: CLINIC | Age: 34
End: 2024-05-06
Payer: MEDICAID

## 2024-05-06 VITALS
WEIGHT: 237 LBS | BODY MASS INDEX: 35.92 KG/M2 | HEART RATE: 77 BPM | DIASTOLIC BLOOD PRESSURE: 79 MMHG | HEIGHT: 68 IN | SYSTOLIC BLOOD PRESSURE: 117 MMHG

## 2024-05-06 DIAGNOSIS — M54.2 CERVICALGIA: ICD-10-CM

## 2024-05-06 DIAGNOSIS — R56.9 CONVULSIONS, UNSPECIFIED CONVULSION TYPE: Primary | ICD-10-CM

## 2024-05-06 PROCEDURE — 99214 OFFICE O/P EST MOD 30 MIN: CPT | Performed by: NURSE PRACTITIONER

## 2024-05-06 PROCEDURE — 1160F RVW MEDS BY RX/DR IN RCRD: CPT | Performed by: NURSE PRACTITIONER

## 2024-05-06 PROCEDURE — 1159F MED LIST DOCD IN RCRD: CPT | Performed by: NURSE PRACTITIONER

## 2024-05-06 RX ORDER — LAMOTRIGINE 100 MG/1
100 TABLET ORAL 2 TIMES DAILY
Qty: 180 TABLET | Refills: 3 | Status: SHIPPED | OUTPATIENT
Start: 2024-05-06 | End: 2025-05-01

## 2024-05-06 RX ORDER — VORTIOXETINE 20 MG/1
20 TABLET, FILM COATED ORAL
COMMUNITY

## 2024-05-06 RX ORDER — LACOSAMIDE 200 MG/1
200 TABLET ORAL EVERY 12 HOURS SCHEDULED
Qty: 60 TABLET | Refills: 5 | Status: SHIPPED | OUTPATIENT
Start: 2024-05-06

## 2024-05-07 ENCOUNTER — TELEPHONE (OUTPATIENT)
Dept: NEUROLOGY | Facility: CLINIC | Age: 34
End: 2024-05-07
Payer: MEDICAID

## 2024-05-10 ENCOUNTER — PATIENT ROUNDING (BHMG ONLY) (OUTPATIENT)
Dept: NEUROLOGY | Facility: CLINIC | Age: 34
End: 2024-05-10
Payer: MEDICAID

## 2024-05-20 DIAGNOSIS — M54.2 CERVICALGIA: Primary | ICD-10-CM

## 2024-05-23 ENCOUNTER — TELEPHONE (OUTPATIENT)
Dept: NEUROLOGY | Facility: CLINIC | Age: 34
End: 2024-05-23
Payer: MEDICAID

## 2024-05-23 NOTE — TELEPHONE ENCOUNTER
Caller: LIZZETTE    Relationship: [facility]    Best call back number: [phone]    What is the best time to reach you:     Who are you requesting to speak with (clinical staff, provider,  specific staff member):   ROLDAN    Do you know the name of the person who called:     What was the call regarding:   MRI HAS BEEN DENIED TO BE DONE AT [facility]. THIS APPT WILL BE CANCELLED. PT WILL NEED TO HAVE THIS AT ANOTHER LOCATION.

## 2024-08-22 ENCOUNTER — HOSPITAL ENCOUNTER (OUTPATIENT)
Dept: NEUROLOGY | Facility: HOSPITAL | Age: 34
Discharge: HOME | End: 2024-08-22
Payer: MEDICAID

## 2024-08-22 DIAGNOSIS — R56.9 CONVULSIONS, UNSPECIFIED CONVULSION TYPE: ICD-10-CM

## 2024-08-26 DIAGNOSIS — F41.9 ANXIETY: Primary | ICD-10-CM

## 2024-08-26 NOTE — PROGRESS NOTES
I referred her to behavioral health.  Find out how much Klonopin she took and I could order that also as a as needed.

## 2024-08-26 NOTE — PROGRESS NOTES
Spoke to patient and she states that every time she has a seizure it seems to be stress induced or is around a stressful situation. She didn't know if she should take something for this. She used to take Klonopin in the past. Or she wasn't sure if she should see a physchiatrist or not.

## 2024-08-26 NOTE — PROGRESS NOTES
The patient's EEG was abnormal and did show a seizure disorder.  Continue on current meds.    IMPRESSION:   This is an abnormal EEG due to the occurrence of frequent spike and wave and polyspike and wave discharges at 3 to 4 cps off-and-on throughout the recording.  There is no clinical correlate noted with these discharges lasting 1 to 2 seconds.  This type abnormality is associated with a seizure disorder of generalized type.      Electronically signed by:    Joseph Seipel, MD  August 23, 2024

## 2024-11-01 ENCOUNTER — PATIENT ROUNDING (BHMG ONLY) (OUTPATIENT)
Dept: PSYCHIATRY | Facility: CLINIC | Age: 34
End: 2024-11-01
Payer: MEDICAID

## 2024-11-01 ENCOUNTER — OFFICE VISIT (OUTPATIENT)
Dept: PSYCHIATRY | Facility: CLINIC | Age: 34
End: 2024-11-01
Payer: MEDICAID

## 2024-11-01 DIAGNOSIS — F41.1 GENERALIZED ANXIETY DISORDER WITH PANIC ATTACKS: Primary | ICD-10-CM

## 2024-11-01 DIAGNOSIS — F33.1 MDD (MAJOR DEPRESSIVE DISORDER), RECURRENT EPISODE, MODERATE: ICD-10-CM

## 2024-11-01 DIAGNOSIS — F41.0 GENERALIZED ANXIETY DISORDER WITH PANIC ATTACKS: Primary | ICD-10-CM

## 2024-11-01 RX ORDER — CLONAZEPAM 0.25 MG/1
0.25 TABLET, ORALLY DISINTEGRATING ORAL 3 TIMES DAILY PRN
Qty: 10 TABLET | Refills: 1 | Status: SHIPPED | OUTPATIENT
Start: 2024-11-01 | End: 2024-11-01

## 2024-11-01 RX ORDER — CLONAZEPAM 0.25 MG/1
0.25 TABLET, ORALLY DISINTEGRATING ORAL 3 TIMES DAILY PRN
Qty: 10 TABLET | Refills: 1 | Status: SHIPPED | OUTPATIENT
Start: 2024-11-01

## 2024-11-01 NOTE — PROGRESS NOTES
A My-chart message has been sent to the patient for PATIENT ROUNDING with [facility].

## 2024-11-01 NOTE — PROGRESS NOTES
Chief complaint: medication management       Subjective      History of present illness:  Long hx of anxiety, depression since childhood  Has not found benefit with previous medications, has not taken medications for some time   Triggers: feeling overwhelmed, job  Alleviating factors Breathing, removing self from situation, quiet space, relaxing   Trouble with sleep for several years   Decreased motivation, decreased pleasure in activities, low energy, disrupted sleep, wakes 1-2 times,   Nervous anxious most days, difficulty controlling worry   Worry's about having seizures   1 seizure every 2-3 months, managed with neurology   Stress induced , established on clonazepam effective for severe anxiety  Daily marijuana use reported to decrease seizures    Denies current self injurious behavior  Denies current symptoms of psychosis, tree, hypomania  Denies current SI/HI/AVH   Denies any current unwanted or adverse medication side effects     Psychiatric History    Previous provider : [facility]   Previous Diagnoses: depression, anxiety   Previous Psychotropic medications: Trintellix (emotional flattening), effexor (muscle spasm), prozac (adolescence) , celexa (sexual s/e, weight gain: 40 mg ), mirtazapine, bupropion    Psychotherapy: Previous   Hospitalization hx: Denies   Previous SI/HI/AVH: Denies     Parents in active addiction throughout life,   Anxious as a child  Adolescence difficult, mother incarcerated , started on Prozac   Hx of binge drinking episodes in 20s, DUI       Social History     Socioeconomic History    Marital status: Single    Number of children: 1    Highest education level: Associate degree: occupational, technical, or vocational program   Tobacco Use    Smoking status: Never    Smokeless tobacco: Never   Vaping Use    Vaping status: Never Used   Substance and Sexual Activity    Alcohol use: Yes     Alcohol/week: 2.0 standard drinks of alcohol     Types: 2 Cans of beer per week      Comment: every 1-2 months    Drug use: Yes     Types: Marijuana    Sexual activity: Defer     Birth control/protection: Nexplanon           Current Outpatient Medications:       Current Outpatient Medications:     brivaracetam (Briviact) 75 MG tablet, Take 1 tablet by mouth 2 (Two) Times a Day., Disp: 60 tablet, Rfl: 5    Etonogestrel (NEXPLANON) 68 MG implant subdermal implant, To be inserted one time by prescriber. Route Subdermal., Disp: , Rfl:     lacosamide (VIMPAT) 200 MG tablet, Take 1 tablet by mouth Every 12 (Twelve) Hours., Disp: 60 tablet, Rfl: 5    lamoTRIgine (LaMICtal) 100 MG tablet, Take 1 tablet by mouth 2 (Two) Times a Day for 360 days., Disp: 180 tablet, Rfl: 3    MAGNESIUM PO, Take  by mouth., Disp: , Rfl:     multivitamin with minerals (MULTIVITAMIN ADULT PO), Take 1 tablet by mouth Daily., Disp: , Rfl:     clonazePAM (KlonoPIN) 0.25 MG disintegrating tablet, Place 1 tablet on the tongue 3 (Three) Times a Day As Needed for Anxiety., Disp: 10 tablet, Rfl: 1    sertraline (Zoloft) 50 MG tablet, Take 1 tablet by mouth Daily., Disp: 30 tablet, Rfl: 2          Allergies:  No Known Allergies     PHQ9    PHQ-9 Depression Screening  Little interest or pleasure in doing things? Almost all   Feeling down, depressed, or hopeless? Almost all   PHQ-2 Total Score 6   Trouble falling or staying asleep, or sleeping too much? Almost all   Feeling tired or having little energy? Almost all   Poor appetite or overeating? Almost all   Feeling bad about yourself - or that you are a failure or have let yourself or your family down? Over half   Trouble concentrating on things, such as reading the newspaper or watching television? Almost all   Moving or speaking so slowly that other people could have noticed? Or the opposite - being so fidgety or restless that you have been moving around a lot more than usual? Several days   Thoughts that you would be better off dead, or of hurting yourself in some way? Not at all    PHQ-9 Total Score 21   If you checked off any problems, how difficult have these problems made it for you to do your work, take care of things at home, or get along with other people? Somewhat difficult        HUANG-7:   Over the last two weeks, how often have you been bothered by the following problems?  Feeling nervous, anxious or on edge: Nearly every day  Not being able to stop or control worrying: Nearly every day  Worrying too much about different things: Nearly every day  Trouble Relaxing: More than half the days  Being so restless that it is hard to sit still: Several days  Becoming easily annoyed or irritable: Several days  Feeling afraid as if something awful might happen: Not at all  HUANG 7 Total Score: 13  If you checked any problems, how difficult have these problems made it for you to do your work, take care of things at home, or get along with other people: Very difficult]    Objective:     Vital Signs   There were no vitals filed for this visit.       MENTAL STATUS EXAM   General Appearance:  Cleanly groomed and dressed  Eye Contact:  Good eye contact  Attitude:  Cooperative  Motor Activity:  Normal gait, posture  Muscle Strength:  Normal  Speech:  Normal rate, tone, volume  Language:  Spontaneous  Mood and affect:  Anxious and depressed  Hopelessness:  Denies  Thought Process:  Logical, goal-directed and linear  Associations/ Thought Content:  No delusions  Hallucinations:  None  Suicidal Ideations:  Not present  Homicidal Ideation:  Not present  Sensorium:  Alert and clear  Orientation:  Person, place, situation and time  Attention Span/ Concentration:  Good  Fund of Knowledge:  Appropriate for age and educational level  Intellectual Functioning:  Average range  Insight:  Good  Judgement:  Good  Reliability:  Good  Impulse Control:  Good            Assessment & Plan   Diagnoses and all orders for this visit:    Diagnoses and all orders for this visit:    1. Generalized anxiety disorder with panic  attacks (Primary)  -     sertraline (Zoloft) 50 MG tablet; Take 1 tablet by mouth Daily.  Dispense: 30 tablet; Refill: 2  -     clonazePAM (KlonoPIN) 0.25 MG disintegrating tablet; Place 1 tablet on the tongue 3 (Three) Times a Day As Needed for Anxiety.  Dispense: 10 tablet; Refill: 1    2. MDD (major depressive disorder), recurrent episode, moderate  -     sertraline (Zoloft) 50 MG tablet; Take 1 tablet by mouth Daily.  Dispense: 30 tablet; Refill: 2    Other orders  -     Discontinue: clonazePAM (KlonoPIN) 0.25 MG disintegrating tablet; Place 1 tablet on the tongue 3 (Three) Times a Day As Needed for Anxiety.  Dispense: 10 tablet; Refill: 1         Treatment Plan:  Continue supportive psychotherapy efforts and medications as indicated. Treatment and medication options discussed during today's visit. Patient ackowledged and verbally consented to continue with current treatment plan and was educated on the importance of compliance with treatment and follow-up appointments.     Medication side effects reviewed and discussed.  Patient agrees to call office with worsening symptoms or adverse medication side effects.   Continue supportive psychotherapy efforts and medications as indicated. Treatment and medication options discussed during today's visit. Patient ackowledged and verbally consented to continue with current treatment plan and was educated on the importance of compliance with treatment and follow-up appointments.     Pt presents with sxs of depression, HUANG with panic attacks   Increased anxiety and panic induces seizures, pt is established on clonazepam and reports effective for severe anxiety/panic symptoms.   Discussed several medication options for anxiety and depression . Discussed caution, slow titration with seizure disorder. Denied previous medications contributing to increased seizures in the past.   Agreeable to start zoloft, start 25 mg nightly for 7 days increase to 50 mg nightly for anxiety and  depression. Consider pristiq if ineffective   Cont clonazepam 0.25 mg ODT #10 tablets per month.   Follow up 8 weeks     Short Term Goals: improve anxiety and depression     Long Term Goals: continue to build rapport with pt     Treatment Plan discussed with: Patient, I discussed the patients findings and my recommendations with patient. Pt is agreeable and approves of plan.    Pt agrees with a safety plan for any thoughts of self injurious behavior. Pt will call this office at [phone] or the suicide hotline at 971.  In an emergency the pt agrees to call 911.    Referring MD has access to consult report and progress notes in EMR     Patt Brandt DNP, APRN   11/01/2024   09:05 EDT

## 2024-11-05 NOTE — PROGRESS NOTES
Subjective: Generalized epilepsy    Patient ID: Aubrie Hilton is a 34 y.o. female.    History of Present Illness Ms. Hilton is a very pleasant 34-year-old  female who was previously followed by Dr. Vidales for Generalized epilepsy.  Dr. Vidales had left the office and she transferred her care to us.  At her last visit, she was sent for a EEG, MRI of the brain and C-spine films.  She was initially seen 5/6/2024.  Today she states that she really has not had any grand mal seizures.  She states she might of had a small 1 but was very anxious and it could have been anxiety attack.  She has been placed on Zoloft and will contact the clinic if she has any more spells.  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      Seizure Type: Generalized epilepsy  Onset: 2010  Last Seizure: 1 small one lasted only a minute since last visit  Medication: Lamotrigine 100 mg twice a day,                      Lacosamide 200 mg am and 200 mg PM                     Briviact 75mg BID.  Any adverse effects of meds-none       Testing:   EEG  IMPRESSION:   This is an abnormal EEG due to the occurrence of frequent spike and wave and polyspike and wave discharges at 3 to 4 cps off-and-on throughout the recording.  There is no clinical correlate noted with these discharges lasting 1 to 2 seconds.  This type abnormality is associated with a seizure disorder of generalized type.   Electronically signed by:   Joseph Seipel, MD  August 23, 2024      7/15/2024  MRI of the brain completed at [facility]  Patient name: Aubrie Hilton  Impression: No acute intracranial process  Chronically signed: Anna Amaya MD      X-ray of the cervical spine completed at [facility] on May 6, 2024  Impression: Minimal degenerative change   without acute process identified.    Signed by Harrison Amaya MD  Copied from initial visit:       Epilepsy description  Onset: 2010,    Triggers: Stress, anxiety  Duration: 2-3 minutes  Frequency:  Had been once every 6 months to a year, but right nor it has been 1 every 3 weeks or so. Her medication has not be regulated since not having a neurologist.4  Timing (awake/asleep/both): awake and typically at the end of the day  Aura: yes, anxious, hear racing   Features at onset: body rush, sweaty palms, heart rate goes up, brain fog  Unresponsiveness (none/partial/complete): complete  Loss of awareness (none/partial/complete): complete  Eyes (closed/staring/rolled back/left/right): move back and forth  head deviation (straight/left/right):  To right   Automatisms (none/lip smacking/fumbling):  snorting  Stiffening/posturing (none/gen/left/right/upper/lower):  yes full body  jerking (none/gen/left/right/upper/lower/asynchronous/myoclonic):  full boy  Tongue biting:  none  Speech (retained/arrested): arrested   Incontinence (none/bowel/bladder):  yes, bladder  Postictal duration/symptoms: exhausted, can take 2-3 days to recover  Injuries: none  Status epilepticus (never/ER visit/admission/intubation:  no   Current medication: Lamotrigine 100 mg twice a day, Lacosamide 200 mg in the morning and 150 mg at night and Briviact 75mg BID.      The following portions of the patient's history were reviewed and updated as appropriate: allergies, current medications, past family history, past medical history, past social history, past surgical history and problem list.    Family History   Problem Relation Age of Onset    Drug abuse Mother     Suicide Attempts Mother     Anxiety disorder Mother     Depression Mother     Drug abuse Father     Drug abuse Maternal Aunt     Anxiety disorder Maternal Aunt     Depression Maternal Aunt     Suicide Attempts Maternal Aunt     Drug abuse Paternal Aunt        Past Medical History:   Diagnosis Date    Anxiety     Depression     Seizures        Social History     Socioeconomic History    Marital status: Single    Number of children: 1    Highest education level: Associate degree:  occupational, technical, or vocational program   Tobacco Use    Smoking status: Never    Smokeless tobacco: Never   Vaping Use    Vaping status: Never Used   Substance and Sexual Activity    Alcohol use: Yes     Alcohol/week: 2.0 standard drinks of alcohol     Types: 2 Cans of beer per week     Comment: every 1-2 months    Drug use: Yes     Types: Marijuana    Sexual activity: Defer     Birth control/protection: Nexplanon         Current Outpatient Medications:     brivaracetam (Briviact) 75 MG tablet, Take 1 tablet by mouth 2 (Two) Times a Day., Disp: 60 tablet, Rfl: 5    busPIRone (BUSPAR) 10 MG tablet, Take 1 tablet by mouth., Disp: , Rfl:     clobetasol (TEMOVATE) 0.05 % external solution, APPLY SOLUTION TOPICALLY TWICE DAILY TO SCALP, Disp: , Rfl:     clonazePAM (KlonoPIN) 0.25 MG disintegrating tablet, Place 1 tablet on the tongue 3 (Three) Times a Day As Needed for Anxiety., Disp: 10 tablet, Rfl: 1    Etonogestrel (NEXPLANON) 68 MG implant subdermal implant, To be inserted one time by prescriber. Route Subdermal., Disp: , Rfl:     ketoconazole (NIZORAL) 2 % shampoo, SHAMPOO TOPICALLY TO SCALP LEAVE IN 5 MINUTES, LATHER THEN RINSE USE 2 TO 3 TIMES A WEEK, Disp: , Rfl:     lacosamide (VIMPAT) 200 MG tablet, Take 1 tablet by mouth Every 12 (Twelve) Hours., Disp: 60 tablet, Rfl: 5    lamoTRIgine (LaMICtal) 100 MG tablet, Take 1 tablet by mouth 2 (Two) Times a Day for 360 days. 1 am 1 pm, Disp: 180 tablet, Rfl: 3    MAGNESIUM PO, Take  by mouth., Disp: , Rfl:     multivitamin with minerals (MULTIVITAMIN ADULT PO), Take 1 tablet by mouth Daily., Disp: , Rfl:     sertraline (Zoloft) 50 MG tablet, Take 1 tablet by mouth Daily., Disp: 30 tablet, Rfl: 2    Review of Systems   All other systems reviewed and are negative.         I have reviewed ROS completed by medical assistant.     Objective:      Neurological Exam  Mental Status  Awake and alert. Oriented only to person, place, time and situation. Speech is normal.  Language is fluent with no aphasia. Attention and concentration are normal. Fund of knowledge is appropriate for level of education.    Cranial Nerves  CN II: Right visual acuity: Normal. Left visual acuity: Normal. Right normal visual field. Left normal visual field.  CN III, IV, VI: Extraocular movements intact bilaterally. No nystagmus. Normal saccades. Normal smooth pursuit.   Right pupil: 2 mm. Reactive to light. Reactive to accommodation.   Left pupil: 2 mm. Reactive to light. Reactive to accommodation.  CN VII: Full and symmetric facial movement.    Motor  Normal muscle bulk throughout. No fasciculations present. Normal muscle tone.    Sensory  Light touch is normal in upper and lower extremities.     Gait  Casual gait is normal including stance, stride, and arm swing.         Assessment/Plan:  Discussion: The patient will be continued on Briviact 75 mg twice daily, Lamictal 100 mg twice daily and Vimpat 200 mg twice daily.  She has been under good control with these meds and doing very well with no breakthrough seizures.  She is to contact the clinic if she has any breakthrough seizures and she also is to continue following seizure precautions.  The patient was told to observe all seizure precautions, including, but not limited to:   If a Seizures occurs: No driving until seizure free for more than 3 months  Avoid all high-risk activity, examples: Take showers instead of baths, avoid swimming without observation, Avoid open heat sources, Avoid working at heights, and Avoid engaging in all potentially hazardous activities , Avoid use of fire arms/hunting,   Patient expressed clear understanding.     Diagnoses and all orders for this visit:    1. Generalized Epilepsy (Primary)  -     brivaracetam (Briviact) 75 MG tablet; Take 1 tablet by mouth 2 (Two) Times a Day.  Dispense: 60 tablet; Refill: 5  -     lamoTRIgine (LaMICtal) 100 MG tablet; Take 1 tablet by mouth 2 (Two) Times a Day for 360 days. 1 am 1 pm   Dispense: 180 tablet; Refill: 3  -     lacosamide (VIMPAT) 200 MG tablet; Take 1 tablet by mouth Every 12 (Twelve) Hours.  Dispense: 60 tablet; Refill: 5      I have reviewed the patient data on [facility]pect and searched both [place] and [place].      Return in about 6 months (around 5/6/2025).     I spent 36 minutes caring for Aubrie on this date of service. This time includes time spent by me in the following activities: preparing for the visit, reviewing tests, obtaining and/or reviewing a separately obtained history, performing a medically appropriate examination and/or evaluation, counseling and educating the patient/family/caregiver, ordering medications, tests, or procedures, and documenting information in the medical record.      This document has been electronically signed by KATHRYN Spangler on November 6, 2024 10:20 EST

## 2024-11-06 ENCOUNTER — OFFICE VISIT (OUTPATIENT)
Dept: NEUROLOGY | Facility: CLINIC | Age: 34
End: 2024-11-06
Payer: MEDICAID

## 2024-11-06 VITALS
HEART RATE: 83 BPM | WEIGHT: 232 LBS | HEIGHT: 68 IN | BODY MASS INDEX: 35.16 KG/M2 | DIASTOLIC BLOOD PRESSURE: 81 MMHG | SYSTOLIC BLOOD PRESSURE: 127 MMHG

## 2024-11-06 DIAGNOSIS — R56.9 CONVULSIONS, UNSPECIFIED CONVULSION TYPE: Primary | Chronic | ICD-10-CM

## 2024-11-06 PROCEDURE — 1160F RVW MEDS BY RX/DR IN RCRD: CPT | Performed by: NURSE PRACTITIONER

## 2024-11-06 PROCEDURE — 99214 OFFICE O/P EST MOD 30 MIN: CPT | Performed by: NURSE PRACTITIONER

## 2024-11-06 PROCEDURE — 1159F MED LIST DOCD IN RCRD: CPT | Performed by: NURSE PRACTITIONER

## 2024-11-06 RX ORDER — LAMOTRIGINE 100 MG/1
100 TABLET ORAL 2 TIMES DAILY
Qty: 180 TABLET | Refills: 3 | Status: SHIPPED | OUTPATIENT
Start: 2024-11-06 | End: 2025-11-01

## 2024-11-06 RX ORDER — BUSPIRONE HYDROCHLORIDE 10 MG/1
10 TABLET ORAL
COMMUNITY
Start: 2024-05-28

## 2024-11-06 RX ORDER — CLOBETASOL PROPIONATE 0.5 MG/ML
SOLUTION TOPICAL
COMMUNITY
Start: 2024-10-11

## 2024-11-06 RX ORDER — KETOCONAZOLE 20 MG/ML
SHAMPOO TOPICAL
COMMUNITY
Start: 2024-10-30

## 2024-11-06 RX ORDER — LACOSAMIDE 200 MG/1
200 TABLET ORAL EVERY 12 HOURS SCHEDULED
Qty: 60 TABLET | Refills: 5 | Status: SHIPPED | OUTPATIENT
Start: 2024-11-06

## 2024-12-27 ENCOUNTER — OFFICE VISIT (OUTPATIENT)
Dept: PSYCHIATRY | Facility: CLINIC | Age: 34
End: 2024-12-27
Payer: MEDICAID

## 2024-12-27 DIAGNOSIS — F41.1 GENERALIZED ANXIETY DISORDER WITH PANIC ATTACKS: Primary | ICD-10-CM

## 2024-12-27 DIAGNOSIS — F33.1 MDD (MAJOR DEPRESSIVE DISORDER), RECURRENT EPISODE, MODERATE: ICD-10-CM

## 2024-12-27 DIAGNOSIS — F41.0 GENERALIZED ANXIETY DISORDER WITH PANIC ATTACKS: Primary | ICD-10-CM

## 2025-01-22 DIAGNOSIS — F41.1 GENERALIZED ANXIETY DISORDER WITH PANIC ATTACKS: ICD-10-CM

## 2025-01-22 DIAGNOSIS — F41.0 GENERALIZED ANXIETY DISORDER WITH PANIC ATTACKS: ICD-10-CM

## 2025-01-22 DIAGNOSIS — F33.1 MDD (MAJOR DEPRESSIVE DISORDER), RECURRENT EPISODE, MODERATE: ICD-10-CM

## 2025-01-22 NOTE — TELEPHONE ENCOUNTER
Rx Refill Note  Requested Prescriptions     Pending Prescriptions Disp Refills    sertraline (ZOLOFT) 50 MG tablet [Pharmacy Med Name: Sertraline HCl 50 MG Oral Tablet] 30 tablet 0     Sig: Take 1 tablet by mouth once daily      Last office visit with prescribing clinician: 12/27/2024     Next office visit with prescribing clinician: 3/28/2025     Office Visit with Patt Brandt, ELBA, ARNULFO (12/27/2024)     Med check - 3-    Latesha Tolliver MA  01/22/25, 09:09 EST

## 2025-02-26 DIAGNOSIS — F41.1 GENERALIZED ANXIETY DISORDER WITH PANIC ATTACKS: ICD-10-CM

## 2025-02-26 DIAGNOSIS — F41.0 GENERALIZED ANXIETY DISORDER WITH PANIC ATTACKS: ICD-10-CM

## 2025-02-26 NOTE — TELEPHONE ENCOUNTER
Rx Refill Note  Requested Prescriptions     Pending Prescriptions Disp Refills    clonazePAM (KlonoPIN) 0.25 MG disintegrating tablet 10 tablet 1     Sig: Place 1 tablet on the tongue 3 (Three) Times a Day As Needed for Anxiety.      Last office visit with prescribing clinician: 12/27/2024     Next office visit with prescribing clinician: 3/28/2025     Office Visit with Patt Brandt, ELBA, APRN (12/27/2024)     No UDS.    Med check - 3-    [facility] - SCAN - trice Riley, 1990, [place] (02/26/2025)     Last Inspect fill: 12-    Latesha Tolliver MA  02/26/25, 11:22 EST

## 2025-02-26 NOTE — TELEPHONE ENCOUNTER
Rx Refill Note  Requested Prescriptions     Pending Prescriptions Disp Refills    clonazePAM (KlonoPIN) 0.25 MG disintegrating tablet 10 tablet 1     Sig: Place 1 tablet on the tongue 3 (Three) Times a Day As Needed for Anxiety.      Last office visit with prescribing clinician: 12/27/2024     Next office visit with prescribing clinician: 3/28/2025           Last Inspect fill:     Latesha Tolliver MA  02/26/25, 09:47 EST

## 2025-02-27 RX ORDER — CLONAZEPAM 0.25 MG/1
0.25 TABLET, ORALLY DISINTEGRATING ORAL AS NEEDED
Qty: 10 TABLET | Refills: 0 | Status: SHIPPED | OUTPATIENT
Start: 2025-02-27

## 2025-03-20 NOTE — PROGRESS NOTES
"Subjective:     Patient ID: Aubrie Hilton is a 30 y.o. female.    This Yobani is a 30-year-old female with history of epilepsy who presents to the neurology clinic today as a new patient to me for the evaluation of seizures.  I reviewed the patient's records.  The patient was last seen in the clinic on May 27, 2020.  She had a brain MRI in 2010 that was negative.  She had an EEG in 2010 that showed generalized discharges as well as an EEG in 2019 also showed generalized discharges.  Seizures started at age 19 (2010).  Works at a dentist office.  Had a seizure 2 days ago.  They are blackout episodes.  Says that they are stressed induced.  Started as GTCS.  Was started on LTG.  Didn't take it consistently and continued to have seizures.  Was having seizures 1-2/year.  No \"petit mal\".  They got worse.  Around age 22 (had daughter).  Took medication more regularly.  Was started on LEV and seizures were worse, more severe.  Went seizure free for about a year.  Then TPM was started.  That got rid of her aura.  Liked it at 25 mg BID.  It was increased to 50 mg BID.  Then LTG was restarted.  Has been weaning off of LEV.  Was on 3,000 mg/day.  Now on 500 mg BID.  In May had a really bad seizure after she was arrested with urinary incontinence.  Got an OWI.  Last GTCS was in August.  Has had 8 of the other types of seizures.  She will have LOC without convulsions.  Before seizures, she will feel tingling and confused.  Feels like the room is closing in.  Lasts seconds to minutes.  No staring spells.  Seizures last about 2 minutes.  Last one had 2 back to back.  Is having seizures once per month.  No tongue biting.  They typically stop on their own.  Takes a minute to come out of it and has a headache and is ready for bed and is drained.  Generally in the evening.  Lives with daughter.  She is 6.  The patient uses the Implanon device for contraception.  She is not trying to get pregnant.  She is currently on levetiracetam " 500 mg twice a day.  This was started 9 years ago.  She was on 1500 mg twice a day.  It causes some muscle twitches.  They are trying to wean off the medication.  The patient's been on topiramate 50 mg for over a year.  She denies any higher doses and denies any side effects.  Lamotrigine was restarted a year ago.  She is on 200 mg twice a day.  She denies higher doses and no side effects.  She has not been on any other medications.    Risk factors:  Childhood/febrile seizures? no  Head trauma/pathology? no  CNS infections? no  Family history of seizures? Mom (may be due to drug abuse)  Driving? yes      The following portions of the patient's history were reviewed and updated as appropriate: allergies, current medications, past family history, past medical history, past social history, past surgical history and problem list.    Review of Systems   Constitutional: Negative for activity change, appetite change and fatigue.   HENT: Negative for ear pain, tinnitus and trouble swallowing.    Eyes: Positive for visual disturbance. Negative for photophobia and pain.   Respiratory: Negative for cough, chest tightness and shortness of breath.    Cardiovascular: Negative for chest pain, palpitations and leg swelling.   Musculoskeletal: Negative for back pain, gait problem and neck pain.   Neurological: Positive for seizures (last episode 2 days ago (black out); last grand mal 2 months ago), light-headedness and headaches (daily). Negative for dizziness, tremors, syncope, facial asymmetry, speech difficulty, weakness and numbness.   Psychiatric/Behavioral: Positive for confusion, decreased concentration and sleep disturbance. Negative for agitation, behavioral problems, dysphoric mood, hallucinations, self-injury and suicidal ideas. The patient is nervous/anxious. The patient is not hyperactive.     I reviewed the ROS documented by the MA.  All other systems negative.      Objective:    Neurologic Exam    Physical Exam    **The patient is wearing a mask**  Constitutional:  Vital signs reviewed.  No apparent distress.  Well groomed.  Eyes:  No injection, no icterus.    Respiratory:  Normal effort.  Clear to auscultation bilaterally.  Cardiovascular:  Regular rate and rhythm.  No murmurs.  No carotid bruits. Symmetric radial pulses.  Musculoskeletal: Normal station.  Gait steady.  Normal arm swing.  Patient able to walk on heels and toes.  Tandem gait intact.  Romberg negative.  Muscle tone and bulk normal in the bilateral upper and lower extremities.  Strength is 5/5 in the bilateral upper and lower extremities proximally and distally unless otherwise specified in the neurological exam.  Skin:  No rashes.  Warm, dry, and intact.  Psychiatric:  Good mood.  Normal affect.    Neurologic:  Mental status-  The patient is alert and oriented to person, place and time. Attention/concentration is within normal limits.  Speech is fluent without dysarthria.  The patient is able to name, repeat and follow complex commands without difficulty.  Immediate memory and delayed recall intact (3/3 words immediate and after 4 minutes).  Fund of knowledge normal.  Cranial nerves- Pupils equally round and reactive to light with intact accomodation.  Visual fields intact.  Extraocular movements intact.  Facial sensation intact.   Hearing intact to finger-rub bilaterally.  SCM and trapezius are 5/5 bilaterally.    Motor-  See musculoskeletal above.  No tremor.  Reflexes- 2+ in the bilateral biceps, brachioradialis, patellar and achilles.  Toes down-going bilaterally.  Sensation- Intact to pinprick and vibration in bilateral upper and lower extremities symmetrically.  Coordination- Intact to finger tapping and heel knee shin bilaterally.   Gait- See musculoskeletal exam above.           Assessment/Plan:    Ms. Hilton is a 30-year-old female with history of primary generalized epilepsy who presents today for evaluation.    1.  Genetic epilepsy with primary  generalized seizures-The patient continues to have seizures at least on a monthly basis.  I would like to increase her topiramate from 50 mg twice a day to 50 mg in the morning 100 mg at night for 2 weeks and then 100 mg twice a day.  We went over pricing using good Rx.  I would like to add folic acid.  We discussed the increased risk of birth defects with her medications including the 4.5% risk of cleft lip and cleft palate with topiramate.  Also, her lamotrigine and possibly the topiramate could decrease the effectiveness of her Implanon device.  If she is not wanting pregnancy, I recommend using a barrier method such as condoms or getting an IUD.  I would also like to get a CMP for drug monitoring purposes.  We discussed routine seizure precautions including but not limited to not driving within 90 days of a seizure.  We will see the patient back in 3 months time or sooner if needed.  In the future, she may benefit from Fycompa.    A total of 40 minutes of face-to-face time was spent with the patient greater than 50% time was spent on counseling regarding her symptoms and medication management.       Problems Addressed this Visit     None      Visit Diagnoses     High risk medication use    -  Primary    Relevant Orders    Comprehensive Metabolic Panel    Generalized epilepsy (CMS/HCC)        Relevant Medications    topiramate (Topamax) 50 MG tablet    Other Relevant Orders    Comprehensive Metabolic Panel      Diagnoses       Codes Comments    High risk medication use    -  Primary ICD-10-CM: Z79.899  ICD-9-CM: V58.69     Generalized epilepsy (CMS/HCC)     ICD-10-CM: G40.309  ICD-9-CM: 345.90                     hard copy, drawn during this pregnancy

## 2025-03-28 ENCOUNTER — OFFICE VISIT (OUTPATIENT)
Dept: PSYCHIATRY | Facility: CLINIC | Age: 35
End: 2025-03-28
Payer: MEDICAID

## 2025-03-28 DIAGNOSIS — F33.1 MDD (MAJOR DEPRESSIVE DISORDER), RECURRENT EPISODE, MODERATE: ICD-10-CM

## 2025-03-28 DIAGNOSIS — F41.1 GENERALIZED ANXIETY DISORDER WITH PANIC ATTACKS: ICD-10-CM

## 2025-03-28 DIAGNOSIS — F41.0 GENERALIZED ANXIETY DISORDER WITH PANIC ATTACKS: ICD-10-CM

## 2025-03-28 RX ORDER — SERTRALINE HYDROCHLORIDE 100 MG/1
100 TABLET, FILM COATED ORAL DAILY
Qty: 30 TABLET | Refills: 2 | Status: SHIPPED | OUTPATIENT
Start: 2025-03-28

## 2025-03-28 NOTE — PROGRESS NOTES
Chief complaint: anxiety       Subjective      History of present illness:  Long hx of anxiety, depression since childhood  Has not found benefit with previous medications, has not taken medications for some time   Triggers: feeling overwhelmed, job  Alleviating factors Breathing, removing self from situation, quiet space, relaxing   Trouble with sleep for several years   Decreased motivation, decreased pleasure in activities, low energy, disrupted sleep, wakes 1-2 times,   Nervous anxious most days, difficulty controlling worry   Worry's about having seizures   1 seizure every 2-3 months, managed with neurology   Stress induced , established on clonazepam effective for severe anxiety  Daily marijuana use reported to decrease seizures      Last appt   Taking zoloft 50 mg , noticeable improvement in anxiety, denies recent panic attacks   Has taken clonazepam approx once for severe anxiety secondary to psychosocial stressor    Sleep disturbance, chronic but reports waking more often 4-5 times per night since starting zoloft, non-restorative sleep overall. Pt expressed the benefit in zoloft outweighs the changes in sleep at this time, wanting to continue the medication to see if it improves over time.   Reports recent psychosocial stressors with work and holiday may have contributed to sleep changes as well.  Anxiety reported appropriate level, manageable with coping skills       Today   Change in position at work-increased anxiety-appropriate   Over the last 2-3 months anxiety has been persistent- causing secondary anxiety associated with fear of experiencing a seizure. Coping skills less effective, more intense anxiety  Feeling overwhelmed- did not express feeling hopeless or helpless. Clonazepam is still effective-takes minimally but has felt the need to take more often lately-preferring to only take when absolutely necessary for intense panic symptoms.  Expressed interest in discussion of medication change today.      Denies current self injurious behavior  Denies current symptoms of psychosis, tree, hypomania  Denies current SI/HI/AVH   Denies any current unwanted or adverse medication side effects     Psychiatric History    Previous provider : [facility]   Previous Diagnoses: depression, anxiety   Previous Psychotropic medications: Trintellix (emotional flattening), effexor (muscle spasm), prozac (adolescence) , celexa (sexual s/e, weight gain: 40 mg ), mirtazapine, bupropion    Psychotherapy: Previous   Hospitalization hx: Denies   Previous SI/HI/AVH: Denies     Parents in active addiction throughout life,   Anxious as a child  Adolescence difficult, mother incarcerated , started on Prozac   Hx of binge drinking episodes in 20s, DUI       Social History     Socioeconomic History    Marital status: Single    Number of children: 1    Highest education level: Associate degree: occupational, technical, or vocational program   Tobacco Use    Smoking status: Never    Smokeless tobacco: Never   Vaping Use    Vaping status: Never Used   Substance and Sexual Activity    Alcohol use: Yes     Alcohol/week: 2.0 standard drinks of alcohol     Types: 2 Cans of beer per week     Comment: every 1-2 months    Drug use: Yes     Types: Marijuana    Sexual activity: Defer     Birth control/protection: Nexplanon           Current Outpatient Medications:       Current Outpatient Medications:     sertraline (ZOLOFT) 100 MG tablet, Take 1 tablet by mouth Daily., Disp: 30 tablet, Rfl: 2    brivaracetam (Briviact) 75 MG tablet, Take 1 tablet by mouth 2 (Two) Times a Day., Disp: 60 tablet, Rfl: 5    clobetasol (TEMOVATE) 0.05 % external solution, APPLY SOLUTION TOPICALLY TWICE DAILY TO SCALP, Disp: , Rfl:     clonazePAM (KlonoPIN) 0.25 MG disintegrating tablet, Place 1 tablet on the tongue As Needed for Anxiety. Up to three times in 24 hours, Disp: 10 tablet, Rfl: 0    Etonogestrel (NEXPLANON) 68 MG implant subdermal implant, To be inserted  one time by prescriber. Route Subdermal., Disp: , Rfl:     ketoconazole (NIZORAL) 2 % shampoo, SHAMPOO TOPICALLY TO SCALP LEAVE IN 5 MINUTES, LATHER THEN RINSE USE 2 TO 3 TIMES A WEEK, Disp: , Rfl:     lacosamide (VIMPAT) 200 MG tablet, Take 1 tablet by mouth Every 12 (Twelve) Hours., Disp: 60 tablet, Rfl: 5    lamoTRIgine (LaMICtal) 100 MG tablet, Take 1 tablet by mouth 2 (Two) Times a Day for 360 days. 1 am 1 pm, Disp: 180 tablet, Rfl: 3    MAGNESIUM PO, Take  by mouth., Disp: , Rfl:     multivitamin with minerals (MULTIVITAMIN ADULT PO), Take 1 tablet by mouth Daily., Disp: , Rfl:           Allergies:  No Known Allergies     PHQ9    PHQ-9 Depression Screening  Little interest or pleasure in doing things? Several days   Feeling down, depressed, or hopeless? Several days   PHQ-2 Total Score 2   Trouble falling or staying asleep, or sleeping too much? Several days   Feeling tired or having little energy? Several days   Poor appetite or overeating? Over half   Feeling bad about yourself - or that you are a failure or have let yourself or your family down? Over half   Trouble concentrating on things, such as reading the newspaper or watching television? Several days   Moving or speaking so slowly that other people could have noticed? Or the opposite - being so fidgety or restless that you have been moving around a lot more than usual? Not at all   Thoughts that you would be better off dead, or of hurting yourself in some way? Not at all   PHQ-9 Total Score 9   If you checked off any problems, how difficult have these problems made it for you to do your work, take care of things at home, or get along with other people? Somewhat difficult        HUANG-7:   Over the last two weeks, how often have you been bothered by the following problems?  Feeling nervous, anxious or on edge: More than half the days  Not being able to stop or control worrying: Several days  Worrying too much about different things: More than half the  days  Trouble Relaxing: Several days  Being so restless that it is hard to sit still: Several days  Becoming easily annoyed or irritable: Several days  Feeling afraid as if something awful might happen: More than half the days  HUANG 7 Total Score: 10  If you checked any problems, how difficult have these problems made it for you to do your work, take care of things at home, or get along with other people: Very difficult]    Objective:     Vital Signs   There were no vitals filed for this visit.       MENTAL STATUS EXAM   General Appearance:  Cleanly groomed and dressed  Eye Contact:  Good eye contact  Attitude:  Cooperative and polite  Motor Activity:  Normal gait, posture  Muscle Strength:  Normal  Speech:  Normal rate, tone, volume  Language:  Spontaneous  Mood and affect:  Normal, pleasant and anxious  Hopelessness:  Denies  Thought Process:  Logical, goal-directed and linear  Associations/ Thought Content:  No delusions  Hallucinations:  None  Suicidal Ideations:  Not present  Homicidal Ideation:  Not present  Sensorium:  Alert and clear  Orientation:  Person, place, situation and time  Attention Span/ Concentration:  Good  Fund of Knowledge:  Appropriate for age and educational level  Intellectual Functioning:  Average range  Insight:  Good  Judgement:  Good  Reliability:  Good  Impulse Control:  Good            Assessment & Plan   Diagnoses and all orders for this visit:    Diagnoses and all orders for this visit:    1. Generalized anxiety disorder with panic attacks  -     sertraline (ZOLOFT) 100 MG tablet; Take 1 tablet by mouth Daily.  Dispense: 30 tablet; Refill: 2    2. MDD (major depressive disorder), recurrent episode, moderate  -     sertraline (ZOLOFT) 100 MG tablet; Take 1 tablet by mouth Daily.  Dispense: 30 tablet; Refill: 2          Treatment Plan:    Pt presents for follow up appt for medication management  of depression, HUANG with panic attacks   Pt has noticed more intense anxiety sxs over the  last 3 months which has been persistent and intense-triggered by change in psychosocial stress. Pt expressed concern about anxiety inducing a seizure -finding herself considering taking clonazepam more than usual. Expressed desire to preserve clonazepam for intended purpose- persistent anxiety contributes and causes secondary depression sxs.   Expressed interest in medication change today-increase zoloft today to treat persistent anxiety -pt agreeable and expressed intent to use nonpharmacological strategies for stress reduction.  Follow up 6 to 8 weeks    Increase Sertraline 100 mg daily for depression, anxiety   Cont clonazepam 0.25 mg ODT #10 tablets per month     Short Term Goals: improve anxiety and depression   Long Term Goals: continue to build rapport with pt           Continue supportive psychotherapy efforts and medications as indicated.   Medication side effects reviewed and discussed.  Patient agrees to call office at [phone] with worsening symptoms or adverse medication side effects.   Continue supportive psychotherapy efforts and medications as indicated. Treatment and medication options discussed during today's visit. Patient ackowledged and verbally consented with treatment plan and was educated on the importance of compliance with treatment and follow-up appointments.   PHQ/HUANG scores reviewed. Pt was given appropriate time to ask questions and concerns were addressed.      Patient is aware phone calls or refill request can take up to 48-72 hours for a response. Patient was informed and encouraged to request medication refills at least 7-10 days prior to need of medication refill. In the case of an urgent matter inform medical assistant available at the time of call. Patient is agreeable to call 911 or go to the nearest ER if experiencing any thoughts of harm to self or others, or with sudden or significant changes in medical condition and health.      Anytime you miss your appointment we are  unable to provide you appropriate care. We ask that you call at least 24 hours in advance to cancel or reschedule an appointment. No show policy stating patients who miss THREE or more appointments without cancelling or rescheduling 24 hours in advance of the appointment may be subject to cancellation of any further visits with our clinic. If there are reasons that make it difficult for you to keep the appointments, please call and let us know how we can help.   Please understand that medication prescribing will not continue without seeing your provider.       Treatment Plan discussed with: Patient, I discussed the patients findings and my recommendations with patient. Pt is agreeable and approves of plan.    Pt agrees with a safety plan for any thoughts of self injurious behavior. Pt will call this office at [phone] or the suicide hotline at 946.  In an emergency the pt agrees to call 911.    Referring MD has access to consult report and progress notes in EMR     Patt Brandt DNP, APRN   03/28/2025   09:05 EDT

## 2025-04-07 DIAGNOSIS — F41.1 GENERALIZED ANXIETY DISORDER WITH PANIC ATTACKS: ICD-10-CM

## 2025-04-07 DIAGNOSIS — F41.0 GENERALIZED ANXIETY DISORDER WITH PANIC ATTACKS: ICD-10-CM

## 2025-04-07 NOTE — TELEPHONE ENCOUNTER
Rx Refill Note  Requested Prescriptions     Pending Prescriptions Disp Refills    clonazePAM (KlonoPIN) 0.25 MG disintegrating tablet [Pharmacy Med Name: clonazePAM 0.25 MG Oral Tablet Disintegrating] 10 tablet 0     Sig: DISSOLVE 1 TABLET IN MOUTH THREE TIMES DAILY AS NEEDED FOR ANXIETY . DO NOT EXCEED 3 PER 24 HOURS MUST  LAST  30  DAYS      Last office visit with prescribing clinician: 3/28/2025   Last telemedicine visit with prescribing clinician: Visit date not found   Next office visit with prescribing clinician: 5/9/2025   Office Visit with Patt Brandt, ELBA, APRN (03/28/2025)   Urine Drug Screen - Urine, Clean Catch (10/12/2021 17:08)                     Would you like a call back once the refill request has been completed: [] Yes [] No    If the office needs to give you a call back, can they leave a voicemail: [] Yes [] No    Lalita Granda MA  04/07/25, 14:00 EDT  INSPECT - SCAN - INSPECT, [facility], 03/27/2025 (03/27/2025)

## 2025-04-08 RX ORDER — CLONAZEPAM 0.25 MG/1
TABLET, ORALLY DISINTEGRATING ORAL
Qty: 10 TABLET | Refills: 0 | Status: SHIPPED | OUTPATIENT
Start: 2025-04-08

## 2025-05-05 DIAGNOSIS — F41.0 GENERALIZED ANXIETY DISORDER WITH PANIC ATTACKS: ICD-10-CM

## 2025-05-05 DIAGNOSIS — F41.1 GENERALIZED ANXIETY DISORDER WITH PANIC ATTACKS: ICD-10-CM

## 2025-05-05 NOTE — TELEPHONE ENCOUNTER
Rx Refill Note  Requested Prescriptions     Pending Prescriptions Disp Refills    clonazePAM (KlonoPIN) 0.25 MG disintegrating tablet [Pharmacy Med Name: clonazePAM 0.25 MG Oral Tablet Disintegrating] 10 tablet 0     Sig: DISSOLVE 1 TABLET IN MOUTH THREE TIMES DAILY AS NEEDED FOR ANXIETY      Last office visit with prescribing clinician: 3/28/2025     Next office visit with prescribing clinician: 5/9/2025     Office Visit with Patt Brandt, DNP, APRN (03/28/2025)     BEHAVIORAL HEALTH - SCAN - Osvaldo, trice, 1990, ernesto (05/05/2025)     No UDS.    Med check - 5-    Last Inspect fill: 4-    Latesha Tolliver MA  05/05/25, 14:33 EDT

## 2025-05-05 NOTE — PROGRESS NOTES
Subjective: Epilepsy, neck pain    Patient ID: Aubrie Hilton is a 34 y.o. female.    History of Present Illness Ms. Hilton is a 34-year-old  female who is followed by this office for epilepsy and minimal C-spine pain.  She was last seen on 2024.  Today she states she has had no further seizures.  She states she feels very good on her current medications and does not want to change.      New problem: She states she has been having severe right low back pain and has been seeing a chiropractor and doing PT.  She states she has had no films of her low back and the pain is getting progressively worse.  I notified the patient that I would send her for an MRI of the lumbar spine with concentration on the right.    Seizure Type: Generalized  Onset: 2010  Frequency:  Last Seizure: no   Semiology change:  Medication: Briviact 75 mg twice daily                     Vimpat 200 mg twice daily                     Lamictal 100 mg twice daily  Any adverse effects of meds?  Failed medications?       Complaint: Right low back pain, Right leg   Onset: Summer 22  Location: Right low back   Quality: Ache throb pain  Severity: 7-8 on scale of 0-10  Duration:    Frequency:Daily  Timing: all the time   Context: walking   Modifying factors: Muscle relaxant decreases pain  Associated Signs and symptoms:  no weakness  Current meds: Zanaflex   She has done PT, Exercise and Chiropractic with no changes         [id number]  X-ray of the cervical spine completed at [facility] on May 6, 2024   Impression: Minimal degenerative change without acute process identified.  Signed by Harrison Amaya MD     7/15/2024   MRI of the brain completed at [facility]   Patient name: Aubrie Hilton   Impression: No acute intracranial process   Chronically signed: Anna Amaya MD         EEG  IMPRESSION:   This is an abnormal EEG due to the occurrence of frequent spike and wave and polyspike and wave discharges at 3  to 4 cps off-and-on throughout the recording.  There is no clinical correlate noted with these discharges lasting 1 to 2 seconds.  This type abnormality is associated with a seizure disorder of generalized type.    Electronically signed by:   Joseph Seipel, MD  August 23, 2024        The following portions of the patient's history were reviewed and updated as appropriate: allergies, current medications, past family history, past medical history, past social history, past surgical history and problem list.    Family History   Problem Relation Age of Onset    Drug abuse Mother     Suicide Attempts Mother     Anxiety disorder Mother     Depression Mother     Drug abuse Father     Drug abuse Maternal Aunt     Anxiety disorder Maternal Aunt     Depression Maternal Aunt     Suicide Attempts Maternal Aunt     Drug abuse Paternal Aunt        Past Medical History:   Diagnosis Date    Anxiety     Depression     Seizures        Social History     Socioeconomic History    Marital status: Single    Number of children: 1    Highest education level: Associate degree: occupational, technical, or vocational program   Tobacco Use    Smoking status: Never    Smokeless tobacco: Never   Vaping Use    Vaping status: Never Used   Substance and Sexual Activity    Alcohol use: Yes     Alcohol/week: 2.0 standard drinks of alcohol     Types: 2 Cans of beer per week     Comment: every 1-2 months    Drug use: Yes     Types: Marijuana    Sexual activity: Defer     Birth control/protection: Nexplanon         Current Outpatient Medications:     brivaracetam (Briviact) 75 MG tablet, Take 1 tablet by mouth 2 (Two) Times a Day., Disp: 60 tablet, Rfl: 5    clobetasol (TEMOVATE) 0.05 % external solution, APPLY SOLUTION TOPICALLY TWICE DAILY TO SCALP, Disp: , Rfl:     clonazePAM (KlonoPIN) 0.25 MG disintegrating tablet, Place 1 tablet on the tongue 1 (One) Time As Needed (severe anxiety)., Disp: 10 tablet, Rfl: 0    Etonogestrel (NEXPLANON) 68 MG  implant subdermal implant, To be inserted one time by prescriber. Route Subdermal., Disp: , Rfl:     folic acid (FOLVITE) 1 MG tablet, Take 1 tablet by mouth Daily., Disp: , Rfl:     ketoconazole (NIZORAL) 2 % shampoo, SHAMPOO TOPICALLY TO SCALP LEAVE IN 5 MINUTES, LATHER THEN RINSE USE 2 TO 3 TIMES A WEEK, Disp: , Rfl:     lacosamide (VIMPAT) 200 MG tablet, Take 1 tablet by mouth Every 12 (Twelve) Hours., Disp: 60 tablet, Rfl: 5    lamoTRIgine (LaMICtal) 100 MG tablet, Take 1 tablet by mouth 2 (Two) Times a Day for 360 days. 1 am 1 pm, Disp: 180 tablet, Rfl: 3    MAGNESIUM PO, Take  by mouth., Disp: , Rfl:     multivitamin with minerals (MULTIVITAMIN ADULT PO), Take 1 tablet by mouth Daily., Disp: , Rfl:     orphenadrine (NORFLEX) 100 MG 12 hr tablet, 0 Refill(s), Disp: , Rfl:     sertraline (ZOLOFT) 100 MG tablet, Take 1 tablet by mouth Daily., Disp: 30 tablet, Rfl: 2    spironolactone (ALDACTONE) 25 MG tablet, 0 Refill(s), Disp: , Rfl:     tiZANidine (ZANAFLEX) 4 MG tablet, Take 1 tablet by mouth., Disp: , Rfl:     Turmeric powder, See Admin Instructions., Disp: , Rfl:     gabapentin (NEURONTIN) 300 MG capsule, Take 1 capsule by mouth Every Night., Disp: 30 capsule, Rfl: 5    Review of Systems   All other systems reviewed and are negative.         I have reviewed ROS completed by medical assistant.     Objective:      Neurological Exam  Mental Status  Awake and alert. Oriented only to person, place, time and situation. Speech is normal. Language is fluent with no aphasia. Attention and concentration are normal. Fund of knowledge is appropriate for level of education.    Cranial Nerves  CN II: Right visual acuity: Normal. Left visual acuity: Normal. Right normal visual field. Left normal visual field.  CN III, IV, VI: Extraocular movements intact bilaterally. No nystagmus. Normal saccades. Normal smooth pursuit.   Right pupil: 2 mm. Round.   Left pupil: 2 mm. Round.  CN VII:  Right: There is no facial  weakness.  Left: There is no facial weakness.    Motor  Normal muscle bulk throughout. No fasciculations present.    Sensory  Light touch is normal in upper and lower extremities.     Gait  Casual gait:  Some difficulty ambulating due to right low back pain.       Assessment/Plan:  Discussion: The patient will be continued on lacosamide, Lamictal and Briviact for seizure control.  She should follow seizure precautions and notify the clinic of any seizures.    The patient was told to observe all seizure precautions, including, but not limited to:   If a Seizures occurs: No driving until seizure free for more than 3 months  Avoid all high-risk activity, examples: Take showers instead of baths, avoid swimming without observation, Avoid open heat sources, Avoid working at heights, and Avoid engaging in all potentially hazardous activities , Avoid use of fire arms/hunting,    Patient expressed clear understanding.     Extensive clinic time counseling patient and family on the following topics: common seizure triggers (sleep deprivation, medication non-compliance, stress, fever, and drugs/alcohol), personal risk for recurrence, epilepsy specific safety issues, and seizure first aid. If events last longer than 5 minutes, if the patient has multiple events without return to baseline, or if the patient has a serious injury from seizure, I advised them to call 911 immediately.       For low back pain:  The patient has had PT and chiropractic care with no improvement.  She will be sent for an MRI of the lumbar spine.  I will call the patient as soon as I receive results.  She will be scheduled back for a follow-up visit in 6 months and should call with any questions or concerns.      Diagnoses and all orders for this visit:    1. Right lumbar pain (Primary)  -     gabapentin (NEURONTIN) 300 MG capsule; Take 1 capsule by mouth Every Night.  Dispense: 30 capsule; Refill: 5  -     MRI Lumbar Spine Without Contrast; Future    2.  Generalized Epilepsy  -     brivaracetam (Briviact) 75 MG tablet; Take 1 tablet by mouth 2 (Two) Times a Day.  Dispense: 60 tablet; Refill: 5  -     Discontinue: lamoTRIgine (LaMICtal) 100 MG tablet; Take 1 tablet by mouth 2 (Two) Times a Day for 360 days. 1 am 1 pm  Dispense: 180 tablet; Refill: 3  -     lamoTRIgine (LaMICtal) 100 MG tablet; Take 1 tablet by mouth 2 (Two) Times a Day for 360 days. 1 am 1 pm  Dispense: 180 tablet; Refill: 3  -     lacosamide (VIMPAT) 200 MG tablet; Take 1 tablet by mouth Every 12 (Twelve) Hours.  Dispense: 60 tablet; Refill: 5          Return in about 6 months (around 11/7/2025) for Nohelia Guillen .     I spent 27 minutes caring for Aubrie on this date of service. This time includes time spent by me in the following activities: preparing for the visit, reviewing tests, obtaining and/or reviewing a separately obtained history, performing a medically appropriate examination and/or evaluation, counseling and educating the patient/family/caregiver, ordering medications, tests, or procedures, and documenting information in the medical record.      This document has been electronically signed by KATHRYN Spangler on May 7, 2025 09:25 EDT

## 2025-05-06 RX ORDER — CLONAZEPAM 0.25 MG/1
0.25 TABLET, ORALLY DISINTEGRATING ORAL ONCE AS NEEDED
Qty: 10 TABLET | Refills: 0 | Status: SHIPPED | OUTPATIENT
Start: 2025-05-06

## 2025-05-07 ENCOUNTER — TELEPHONE (OUTPATIENT)
Dept: NEUROLOGY | Facility: CLINIC | Age: 35
End: 2025-05-07

## 2025-05-07 ENCOUNTER — OFFICE VISIT (OUTPATIENT)
Dept: NEUROLOGY | Facility: CLINIC | Age: 35
End: 2025-05-07
Payer: MEDICAID

## 2025-05-07 VITALS
BODY MASS INDEX: 35.61 KG/M2 | WEIGHT: 235 LBS | HEART RATE: 76 BPM | DIASTOLIC BLOOD PRESSURE: 78 MMHG | HEIGHT: 68 IN | SYSTOLIC BLOOD PRESSURE: 123 MMHG

## 2025-05-07 DIAGNOSIS — M54.50 RIGHT LUMBAR PAIN: Primary | ICD-10-CM

## 2025-05-07 DIAGNOSIS — R56.9 CONVULSIONS, UNSPECIFIED CONVULSION TYPE: Chronic | ICD-10-CM

## 2025-05-07 PROCEDURE — 1160F RVW MEDS BY RX/DR IN RCRD: CPT | Performed by: NURSE PRACTITIONER

## 2025-05-07 PROCEDURE — 99213 OFFICE O/P EST LOW 20 MIN: CPT | Performed by: NURSE PRACTITIONER

## 2025-05-07 PROCEDURE — 1159F MED LIST DOCD IN RCRD: CPT | Performed by: NURSE PRACTITIONER

## 2025-05-07 RX ORDER — LAMOTRIGINE 100 MG/1
100 TABLET ORAL 2 TIMES DAILY
Qty: 180 TABLET | Refills: 3 | Status: SHIPPED | OUTPATIENT
Start: 2025-05-07 | End: 2025-05-07 | Stop reason: SDUPTHER

## 2025-05-07 RX ORDER — LAMOTRIGINE 100 MG/1
100 TABLET ORAL 2 TIMES DAILY
Qty: 180 TABLET | Refills: 3 | Status: SHIPPED | OUTPATIENT
Start: 2025-05-07 | End: 2026-05-02

## 2025-05-07 RX ORDER — SPIRONOLACTONE 25 MG/1
TABLET ORAL
COMMUNITY
Start: 2024-12-22

## 2025-05-07 RX ORDER — GABAPENTIN 300 MG/1
300 CAPSULE ORAL NIGHTLY
Qty: 30 CAPSULE | Refills: 5 | Status: SHIPPED | OUTPATIENT
Start: 2025-05-07 | End: 2025-05-08 | Stop reason: SDUPTHER

## 2025-05-07 RX ORDER — FOLIC ACID 1 MG/1
1000 TABLET ORAL DAILY
COMMUNITY

## 2025-05-07 RX ORDER — ORPHENADRINE CITRATE 100 MG/1
TABLET ORAL
COMMUNITY
Start: 2024-11-13

## 2025-05-07 RX ORDER — TURMERIC 100 %
POWDER (GRAM) MISCELLANEOUS SEE ADMIN INSTRUCTIONS
COMMUNITY

## 2025-05-07 RX ORDER — LACOSAMIDE 200 MG/1
200 TABLET ORAL EVERY 12 HOURS SCHEDULED
Qty: 60 TABLET | Refills: 5 | Status: SHIPPED | OUTPATIENT
Start: 2025-05-07

## 2025-05-07 NOTE — TELEPHONE ENCOUNTER
Tried to call. It may take a while to help.  You should only take a bedtime first. Just gave it today. Can increase next week if needed.  Her voicemail is full and could not leave message.

## 2025-05-07 NOTE — TELEPHONE ENCOUNTER
PATIENT CALLING TO ASK -    IN CASE GABAPENTIN SHE WAS JUST PRESCRIBED, DOESN'T WORK, IS THERE ANOTHER OPTION FOR HER BEFORE SHE GOES ON VACATION - 5/30/25    PLEASE ADVISE PATIENT

## 2025-05-08 ENCOUNTER — HOSPITAL ENCOUNTER (EMERGENCY)
Facility: HOSPITAL | Age: 35
Discharge: HOME | End: 2025-05-08
Attending: EMERGENCY MEDICINE
Payer: MEDICAID

## 2025-05-08 VITALS
DIASTOLIC BLOOD PRESSURE: 79 MMHG | SYSTOLIC BLOOD PRESSURE: 129 MMHG | OXYGEN SATURATION: 98 % | RESPIRATION RATE: 18 BRPM | HEIGHT: 68 IN | WEIGHT: 238.1 LBS | BODY MASS INDEX: 36.09 KG/M2 | TEMPERATURE: 98.3 F | HEART RATE: 67 BPM

## 2025-05-08 DIAGNOSIS — M54.50 ACUTE LOW BACK PAIN, UNSPECIFIED BACK PAIN LATERALITY, UNSPECIFIED WHETHER SCIATICA PRESENT: Primary | ICD-10-CM

## 2025-05-08 DIAGNOSIS — M54.50 RIGHT LUMBAR PAIN: ICD-10-CM

## 2025-05-08 PROCEDURE — 99282 EMERGENCY DEPT VISIT SF MDM: CPT

## 2025-05-08 PROCEDURE — 96372 THER/PROPH/DIAG INJ SC/IM: CPT

## 2025-05-08 PROCEDURE — 25010000002 KETOROLAC TROMETHAMINE PER 15 MG: Performed by: EMERGENCY MEDICINE

## 2025-05-08 RX ORDER — GABAPENTIN 300 MG/1
300 CAPSULE ORAL 3 TIMES DAILY
Qty: 90 CAPSULE | Refills: 5 | Status: SHIPPED | OUTPATIENT
Start: 2025-05-08

## 2025-05-08 RX ORDER — KETOROLAC TROMETHAMINE 30 MG/ML
30 INJECTION, SOLUTION INTRAMUSCULAR; INTRAVENOUS ONCE
Status: COMPLETED | OUTPATIENT
Start: 2025-05-08 | End: 2025-05-08

## 2025-05-08 RX ORDER — NAPROXEN 375 MG/1
375 TABLET ORAL 2 TIMES DAILY PRN
Qty: 14 TABLET | Refills: 0 | Status: SHIPPED | OUTPATIENT
Start: 2025-05-08

## 2025-05-08 RX ORDER — CYCLOBENZAPRINE HCL 10 MG
10 TABLET ORAL 3 TIMES DAILY PRN
Qty: 15 TABLET | Refills: 0 | Status: SHIPPED | OUTPATIENT
Start: 2025-05-08

## 2025-05-08 RX ADMIN — KETOROLAC TROMETHAMINE 30 MG: 30 INJECTION, SOLUTION INTRAMUSCULAR at 10:32

## 2025-05-08 NOTE — ED PROVIDER NOTES
Subjective   History of Present Illness  Patient is a 34-year-old female complaining of chronic low back pain which has been worse the past 5 weeks.  She denies bowel or bladder abnormality.  She does have tingling to both legs.  She is currently following with a neurologist for her epilepsy who tried on some different medication for her back.  She denies further complaints at this time.      Review of Systems    Past Medical History:   Diagnosis Date    Anxiety     Depression     Seizures        No Known Allergies    Past Surgical History:   Procedure Laterality Date     SECTION      MOUTH SURGERY      TONSILLECTOMY AND ADENOIDECTOMY         Family History   Problem Relation Age of Onset    Drug abuse Mother     Suicide Attempts Mother     Anxiety disorder Mother     Depression Mother     Drug abuse Father     Drug abuse Maternal Aunt     Anxiety disorder Maternal Aunt     Depression Maternal Aunt     Suicide Attempts Maternal Aunt     Drug abuse Paternal Aunt        Social History     Socioeconomic History    Marital status: Single    Number of children: 1    Highest education level: Associate degree: occupational, technical, or vocational program   Tobacco Use    Smoking status: Never    Smokeless tobacco: Never   Vaping Use    Vaping status: Never Used   Substance and Sexual Activity    Alcohol use: Yes     Alcohol/week: 2.0 standard drinks of alcohol     Types: 2 Cans of beer per week     Comment: every 1-2 months    Drug use: Yes     Types: Marijuana    Sexual activity: Defer     Birth control/protection: Nexplanon           Objective   Physical Exam  Back has diffuse low back pain on palpation.  She has decreased range of motion due to pain.  She has negative straight leg raises.  Neurologic exam is nonfocal.  Procedures           ED Course                                                       Medical Decision Making  Patient has a nonfocal neurologic exam.  She was given Toradol IM.  She will be  discharged with a prescription for Naprosyn and Flexeril.  She has a heating pad and see MD for recheck as needed.    Risk  Prescription drug management.        Final diagnoses:   Acute low back pain, unspecified back pain laterality, unspecified whether sciatica present       ED Disposition  ED Disposition       ED Disposition   Discharge    Condition   Stable    Comment   --               No follow-up provider specified.       Medication List        New Prescriptions      cyclobenzaprine 10 MG tablet  Commonly known as: FLEXERIL  Take 1 tablet by mouth 3 (Three) Times a Day As Needed for Muscle Spasms for up to 15 doses.     naproxen 375 MG tablet  Commonly known as: NAPROSYN  Take 1 tablet by mouth 2 (Two) Times a Day As Needed for Moderate Pain.               Where to Get Your Medications        Information about where to get these medications is not yet available    Ask your nurse or doctor about these medications  cyclobenzaprine 10 MG tablet  naproxen 375 MG tablet            Walt Greer MD  05/08/25 1016

## 2025-05-09 ENCOUNTER — OFFICE VISIT (OUTPATIENT)
Dept: PSYCHIATRY | Facility: CLINIC | Age: 35
End: 2025-05-09
Payer: MEDICAID

## 2025-05-09 DIAGNOSIS — F43.23 ADJUSTMENT DISORDER WITH MIXED ANXIETY AND DEPRESSED MOOD: Primary | ICD-10-CM

## 2025-05-09 DIAGNOSIS — F41.0 GENERALIZED ANXIETY DISORDER WITH PANIC ATTACKS: ICD-10-CM

## 2025-05-09 DIAGNOSIS — F33.1 MDD (MAJOR DEPRESSIVE DISORDER), RECURRENT EPISODE, MODERATE: ICD-10-CM

## 2025-05-09 DIAGNOSIS — F41.1 GENERALIZED ANXIETY DISORDER WITH PANIC ATTACKS: ICD-10-CM

## 2025-05-09 NOTE — PROGRESS NOTES
Chief complaint: anxiety       Subjective      History of present illness:  Long hx of anxiety, depression since childhood  Has not found benefit with previous medications, has not taken medications for some time   Triggers: feeling overwhelmed, job  Alleviating factors Breathing, removing self from situation, quiet space, relaxing   Trouble with sleep for several years   Decreased motivation, decreased pleasure in activities, low energy, disrupted sleep, wakes 1-2 times,   Nervous anxious most days, difficulty controlling worry   Worry's about having seizures   1 seizure every 2-3 months, managed with neurology   Stress induced , established on clonazepam effective for severe anxiety  Daily marijuana use reported to decrease seizures          Last appt   Change in position at work-increased anxiety-appropriate   Over the last 2-3 months anxiety has been persistent- causing secondary anxiety associated with fear of experiencing a seizure. Coping skills less effective, more intense anxiety  Feeling overwhelmed- did not express feeling hopeless or helpless. Clonazepam is still effective-takes minimally but has felt the need to take more often lately-preferring to only take when absolutely necessary for intense panic symptoms.  Expressed interest in discussion of medication change today.       Today   Significant psychosocial stressors - increased chronic pain contributing to difficulty sleeping, and anxiety. Reported increase in sertraline had significant benefit for anxiety- recent stressors more manageable with increase. Expressed desire to lose weight -contributing to chronic back pain.   Expressed desire to continue to improve cognitive behaviors -use coping skills   Feeling somewhat discouraged with recent stressors   Denies current self injurious behavior  Denies current symptoms of psychosis, tree, hypomania  Denies current SI/HI/AVH   Denies any current unwanted or adverse medication side effects      Psychiatric History    Previous provider : [facility]   Previous Diagnoses: depression, anxiety   Previous Psychotropic medications: Trintellix (emotional flattening), effexor (muscle spasm), prozac (adolescence) , celexa (sexual s/e, weight gain: 40 mg ), mirtazapine, bupropion    Psychotherapy: Previous   Hospitalization hx: Denies   Previous SI/HI/AVH: Denies     Parents in active addiction throughout life,   Anxious as a child  Adolescence difficult, mother incarcerated , started on Prozac   Hx of binge drinking episodes in 20s, DUI       Social History     Socioeconomic History    Marital status: Single    Number of children: 1    Highest education level: Associate degree: occupational, technical, or vocational program   Tobacco Use    Smoking status: Never    Smokeless tobacco: Never   Vaping Use    Vaping status: Never Used   Substance and Sexual Activity    Alcohol use: Yes     Alcohol/week: 2.0 standard drinks of alcohol     Types: 2 Cans of beer per week     Comment: every 1-2 months    Drug use: Yes     Types: Marijuana    Sexual activity: Defer     Birth control/protection: Nexplanon           Current Outpatient Medications:       Current Outpatient Medications:     brivaracetam (Briviact) 75 MG tablet, Take 1 tablet by mouth 2 (Two) Times a Day., Disp: 60 tablet, Rfl: 5    clobetasol (TEMOVATE) 0.05 % external solution, APPLY SOLUTION TOPICALLY TWICE DAILY TO SCALP, Disp: , Rfl:     clonazePAM (KlonoPIN) 0.25 MG disintegrating tablet, Place 1 tablet on the tongue 1 (One) Time As Needed (severe anxiety)., Disp: 10 tablet, Rfl: 0    cyclobenzaprine (FLEXERIL) 10 MG tablet, Take 1 tablet by mouth 3 (Three) Times a Day As Needed for Muscle Spasms for up to 15 doses., Disp: 15 tablet, Rfl: 0    Etonogestrel (NEXPLANON) 68 MG implant subdermal implant, To be inserted one time by prescriber. Route Subdermal., Disp: , Rfl:     folic acid (FOLVITE) 1 MG tablet, Take 1 tablet by mouth Daily., Disp: ,  Rfl:     gabapentin (NEURONTIN) 300 MG capsule, Take 1 capsule by mouth 3 (Three) Times a Day. Start slowly: Bedtime for 1 week, twice a day for 1 week then up to 3 times a day if needed.  Can make you drowsy do not drive if drowsy., Disp: 90 capsule, Rfl: 5    ketoconazole (NIZORAL) 2 % shampoo, SHAMPOO TOPICALLY TO SCALP LEAVE IN 5 MINUTES, LATHER THEN RINSE USE 2 TO 3 TIMES A WEEK, Disp: , Rfl:     lacosamide (VIMPAT) 200 MG tablet, Take 1 tablet by mouth Every 12 (Twelve) Hours., Disp: 60 tablet, Rfl: 5    lamoTRIgine (LaMICtal) 100 MG tablet, Take 1 tablet by mouth 2 (Two) Times a Day for 360 days. 1 am 1 pm, Disp: 180 tablet, Rfl: 3    MAGNESIUM PO, Take  by mouth., Disp: , Rfl:     multivitamin with minerals (MULTIVITAMIN ADULT PO), Take 1 tablet by mouth Daily., Disp: , Rfl:     naproxen (NAPROSYN) 375 MG tablet, Take 1 tablet by mouth 2 (Two) Times a Day As Needed for Moderate Pain., Disp: 14 tablet, Rfl: 0    orphenadrine (NORFLEX) 100 MG 12 hr tablet, 0 Refill(s), Disp: , Rfl:     sertraline (ZOLOFT) 100 MG tablet, Take 1 tablet by mouth Daily., Disp: 30 tablet, Rfl: 2    spironolactone (ALDACTONE) 25 MG tablet, 0 Refill(s), Disp: , Rfl:     Turmeric powder, See Admin Instructions., Disp: , Rfl:           Allergies:  No Known Allergies     PHQ9    PHQ-9 Depression Screening  Little interest or pleasure in doing things? Several days   Feeling down, depressed, or hopeless? Several days   PHQ-2 Total Score 2   Trouble falling or staying asleep, or sleeping too much? Several days   Feeling tired or having little energy? Several days   Poor appetite or overeating? Over half   Feeling bad about yourself - or that you are a failure or have let yourself or your family down? Over half   Trouble concentrating on things, such as reading the newspaper or watching television? Several days   Moving or speaking so slowly that other people could have noticed? Or the opposite - being so fidgety or restless that you have  been moving around a lot more than usual? Not at all   Thoughts that you would be better off dead, or of hurting yourself in some way? Not at all   PHQ-9 Total Score 9   If you checked off any problems, how difficult have these problems made it for you to do your work, take care of things at home, or get along with other people? Somewhat difficult        HUANG-7:   Over the last two weeks, how often have you been bothered by the following problems?  Feeling nervous, anxious or on edge: Several days  Not being able to stop or control worrying: Several days  Worrying too much about different things: Several days  Trouble Relaxing: Several days  Being so restless that it is hard to sit still: Not at all  Becoming easily annoyed or irritable: Not at all  Feeling afraid as if something awful might happen: Several days  HUANG 7 Total Score: 5  If you checked any problems, how difficult have these problems made it for you to do your work, take care of things at home, or get along with other people: Somewhat difficult]    Objective:     Vital Signs   There were no vitals filed for this visit.       MENTAL STATUS EXAM   General Appearance:  Cleanly groomed and dressed  Eye Contact:  Good eye contact  Attitude:  Cooperative and polite  Motor Activity:  Normal gait, posture  Muscle Strength:  Normal  Speech:  Normal rate, tone, volume  Language:  Spontaneous  Mood and affect:  Normal, pleasant and anxious  Hopelessness:  Denies  Thought Process:  Logical, goal-directed and linear  Associations/ Thought Content:  No delusions  Hallucinations:  None  Suicidal Ideations:  Not present  Homicidal Ideation:  Not present  Sensorium:  Alert and clear  Orientation:  Person, place, situation and time  Attention Span/ Concentration:  Good  Fund of Knowledge:  Appropriate for age and educational level  Intellectual Functioning:  Average range  Insight:  Good  Judgement:  Good  Reliability:  Good  Impulse Control:  Good            Assessment &  Plan   Diagnoses and all orders for this visit:    Diagnoses and all orders for this visit:    1. Adjustment disorder with mixed anxiety and depressed mood (Primary)    2. Generalized anxiety disorder with panic attacks    3. MDD (major depressive disorder), recurrent episode, moderate            Treatment Plan:    Pt presents for follow up appt for medication management  of depression, HUANG with panic attacks   Pt has noticed more intense anxiety sxs over the last 3 months which has been persistent and intense-triggered by change in psychosocial stress. Pt expressed concern about anxiety inducing a seizure -finding herself considering taking clonazepam more than usual. Expressed desire to preserve clonazepam for intended purpose- persistent anxiety contributes and causes secondary depression sxs. Sertraline was increased with benefit for tolerance and severity of anxiety secondary to psychosocial stressors and pain   Short Term Goals: improve anxiety and depression   Long Term Goals: continue to build rapport with pt - remission of distressing sxs   Follow up 12 weeks    Continue Sertraline 100 mg daily for depression, anxiety   Cont clonazepam 0.25 mg ODT #10 tablets per month + refills              Continue supportive psychotherapy efforts and medications as indicated.   Medication side effects reviewed and discussed.  Patient agrees to call office at [phone] with worsening symptoms or adverse medication side effects.   Continue supportive psychotherapy efforts and medications as indicated. Treatment and medication options discussed during today's visit. Patient ackowledged and verbally consented with treatment plan and was educated on the importance of compliance with treatment and follow-up appointments.   PHQ/HUANG scores reviewed. Pt was given appropriate time to ask questions and concerns were addressed.      Patient is aware phone calls or refill request can take up to 48-72 hours for a response. Patient  was informed and encouraged to request medication refills at least 7-10 days prior to need of medication refill. In the case of an urgent matter inform medical assistant available at the time of call. Patient is agreeable to call 911 or go to the nearest ER if experiencing any thoughts of harm to self or others, or with sudden or significant changes in medical condition and health.      Anytime you miss your appointment we are unable to provide you appropriate care. We ask that you call at least 24 hours in advance to cancel or reschedule an appointment. No show policy stating patients who miss THREE or more appointments without cancelling or rescheduling 24 hours in advance of the appointment may be subject to cancellation of any further visits with our clinic. If there are reasons that make it difficult for you to keep the appointments, please call and let us know how we can help.   Please understand that medication prescribing will not continue without seeing your provider.       Treatment Plan discussed with: Patient, I discussed the patients findings and my recommendations with patient. Pt is agreeable and approves of plan.    Pt agrees with a safety plan for any thoughts of self injurious behavior. Pt will call this office at [phone] or the suicide hotline at 243.  In an emergency the pt agrees to call 911.    Referring MD has access to consult report and progress notes in EMR     Patt Brandt DNP, APRN   05/09/2025   09:05 EDT

## 2025-05-14 NOTE — PROGRESS NOTES
Chief complaint: medication management       Subjective      History of present illness:  Long hx of anxiety, depression since childhood  Has not found benefit with previous medications, has not taken medications for some time   Triggers: feeling overwhelmed, job  Alleviating factors Breathing, removing self from situation, quiet space, relaxing   Trouble with sleep for several years   Decreased motivation, decreased pleasure in activities, low energy, disrupted sleep, wakes 1-2 times,   Nervous anxious most days, difficulty controlling worry   Worry's about having seizures   1 seizure every 2-3 months, managed with neurology   Stress induced , established on clonazepam effective for severe anxiety  Daily marijuana use reported to decrease seizures      Today   Taking zoloft 50 mg , noticeable improvement in anxiety, denies recent panic attacks   Has taken clonazepam approx once for severe anxiety secondary to psychosocial stressor    Sleep disturbance, chronic but reports waking more often 4-5 times per night since starting zoloft, non-restorative sleep overall. Pt expressed the benefit in zoloft outweighs the changes in sleep at this time, wanting to continue the medication to see if it improves over time.   Reports recent psychosocial stressors with work and holiday may have contributed to sleep changes as well.  Anxiety reported appropriate level, manageable with coping skills     Denies current self injurious behavior  Denies current symptoms of psychosis, tree, hypomania  Denies current SI/HI/AVH   Denies any current unwanted or adverse medication side effects     Psychiatric History    Previous provider : [facility]   Previous Diagnoses: depression, anxiety   Previous Psychotropic medications: Trintellix (emotional flattening), effexor (muscle spasm), prozac (adolescence) , celexa (sexual s/e, weight gain: 40 mg ), mirtazapine, bupropion    Psychotherapy: Previous   Hospitalization hx: Denies  CCU ACCEPT NOTE/Event Note   Previous SI/HI/AVH: Denies     Parents in active addiction throughout life,   Anxious as a child  Adolescence difficult, mother incarcerated , started on Prozac   Hx of binge drinking episodes in 20s, DUI       Social History     Socioeconomic History    Marital status: Single    Number of children: 1    Highest education level: Associate degree: occupational, technical, or vocational program   Tobacco Use    Smoking status: Never    Smokeless tobacco: Never   Vaping Use    Vaping status: Never Used   Substance and Sexual Activity    Alcohol use: Yes     Alcohol/week: 2.0 standard drinks of alcohol     Types: 2 Cans of beer per week     Comment: every 1-2 months    Drug use: Yes     Types: Marijuana    Sexual activity: Defer     Birth control/protection: Nexplanon           Current Outpatient Medications:       Current Outpatient Medications:     brivaracetam (Briviact) 75 MG tablet, Take 1 tablet by mouth 2 (Two) Times a Day., Disp: 60 tablet, Rfl: 5    clobetasol (TEMOVATE) 0.05 % external solution, APPLY SOLUTION TOPICALLY TWICE DAILY TO SCALP, Disp: , Rfl:     clonazePAM (KlonoPIN) 0.25 MG disintegrating tablet, Place 1 tablet on the tongue 3 (Three) Times a Day As Needed for Anxiety., Disp: 10 tablet, Rfl: 1    Etonogestrel (NEXPLANON) 68 MG implant subdermal implant, To be inserted one time by prescriber. Route Subdermal., Disp: , Rfl:     ketoconazole (NIZORAL) 2 % shampoo, SHAMPOO TOPICALLY TO SCALP LEAVE IN 5 MINUTES, LATHER THEN RINSE USE 2 TO 3 TIMES A WEEK, Disp: , Rfl:     lacosamide (VIMPAT) 200 MG tablet, Take 1 tablet by mouth Every 12 (Twelve) Hours., Disp: 60 tablet, Rfl: 5    lamoTRIgine (LaMICtal) 100 MG tablet, Take 1 tablet by mouth 2 (Two) Times a Day for 360 days. 1 am 1 pm, Disp: 180 tablet, Rfl: 3    MAGNESIUM PO, Take  by mouth., Disp: , Rfl:     multivitamin with minerals (MULTIVITAMIN ADULT PO), Take 1 tablet by mouth Daily., Disp: , Rfl:     sertraline (Zoloft) 50 MG tablet, Take 1  tablet by mouth Daily., Disp: 30 tablet, Rfl: 2          Allergies:  No Known Allergies     PHQ9    PHQ-9 Depression Screening  Little interest or pleasure in doing things? Several days   Feeling down, depressed, or hopeless? Several days   PHQ-2 Total Score 2   Trouble falling or staying asleep, or sleeping too much? Almost all   Feeling tired or having little energy? Over half   Poor appetite or overeating? Almost all   Feeling bad about yourself - or that you are a failure or have let yourself or your family down? Over half   Trouble concentrating on things, such as reading the newspaper or watching television? Several days   Moving or speaking so slowly that other people could have noticed? Or the opposite - being so fidgety or restless that you have been moving around a lot more than usual? Not at all   Thoughts that you would be better off dead, or of hurting yourself in some way? Not at all   PHQ-9 Total Score 13   If you checked off any problems, how difficult have these problems made it for you to do your work, take care of things at home, or get along with other people? Somewhat difficult        HUANG-7:   Over the last two weeks, how often have you been bothered by the following problems?  Feeling nervous, anxious or on edge: Several days  Not being able to stop or control worrying: Several days  Worrying too much about different things: Not at all  Trouble Relaxing: Not at all  Being so restless that it is hard to sit still: Several days  Becoming easily annoyed or irritable: Not at all  Feeling afraid as if something awful might happen: Not at all  HUANG 7 Total Score: 3  If you checked any problems, how difficult have these problems made it for you to do your work, take care of things at home, or get along with other people: Somewhat difficult]    Objective:     Vital Signs   There were no vitals filed for this visit.       MENTAL STATUS EXAM   General Appearance:  Cleanly groomed and dressed  Eye Contact:   Good eye contact  Attitude:  Cooperative and polite  Motor Activity:  Normal gait, posture  Muscle Strength:  Normal  Speech:  Normal rate, tone, volume  Language:  Spontaneous  Mood and affect:  Normal, pleasant and happy  Hopelessness:  Denies  Thought Process:  Logical, goal-directed and linear  Associations/ Thought Content:  No delusions  Hallucinations:  None  Suicidal Ideations:  Not present  Homicidal Ideation:  Not present  Sensorium:  Alert and clear  Orientation:  Person, place, situation and time  Attention Span/ Concentration:  Good  Fund of Knowledge:  Appropriate for age and educational level  Intellectual Functioning:  Average range  Insight:  Good  Judgement:  Good  Reliability:  Good  Impulse Control:  Good            Assessment & Plan   Diagnoses and all orders for this visit:    Diagnoses and all orders for this visit:    1. Generalized anxiety disorder with panic attacks (Primary)    2. MDD (major depressive disorder), recurrent episode, moderate        Treatment Plan:  Continue supportive psychotherapy efforts and medications as indicated. Treatment and medication options discussed during today's visit. Patient ackowledged and verbally consented to continue with current treatment plan and was educated on the importance of compliance with treatment and follow-up appointments.     Medication side effects reviewed and discussed.  Patient agrees to call office with worsening symptoms or adverse medication side effects.   Continue supportive psychotherapy efforts and medications as indicated. Treatment and medication options discussed during today's visit. Patient ackowledged and verbally consented to continue with current treatment plan and was educated on the importance of compliance with treatment and follow-up appointments.     Pt presents for follow up appt for medication management  of depression, HUANG with panic attacks   PRN clonazepam and reports effective for severe anxiety/panic symptoms,  reports taking rarely approx once since last appt.   Last appt pt was started on zoloft now taking 50 mg daily, noticed significant benefit for anxiety  Reported changes in sleep, expressed desire to continue zoloft to see if sleep improves.     Cont Sertraline 50 mg daily for depression, anxiety   Cont clonazepam 0.25 mg ODT #10 tablets per month     Follow up 3 months     Short Term Goals: improve anxiety and depression     Long Term Goals: continue to build rapport with pt     Treatment Plan discussed with: Patient, I discussed the patients findings and my recommendations with patient. Pt is agreeable and approves of plan.    Pt agrees with a safety plan for any thoughts of self injurious behavior. Pt will call this office at [phone] or the suicide hotline at 661.  In an emergency the pt agrees to call 911.    Referring MD has access to consult report and progress notes in EMR     Patt Brandt DNP, APRN   12/27/2024   09:05 EDT

## 2025-05-16 ENCOUNTER — OFFICE VISIT (OUTPATIENT)
Dept: FAMILY MEDICINE CLINIC | Facility: CLINIC | Age: 35
End: 2025-05-16
Payer: MEDICAID

## 2025-05-16 VITALS
HEIGHT: 68 IN | BODY MASS INDEX: 36.98 KG/M2 | WEIGHT: 244 LBS | OXYGEN SATURATION: 96 % | DIASTOLIC BLOOD PRESSURE: 80 MMHG | SYSTOLIC BLOOD PRESSURE: 130 MMHG | HEART RATE: 88 BPM | RESPIRATION RATE: 20 BRPM

## 2025-05-16 DIAGNOSIS — G89.29 CHRONIC RIGHT-SIDED LOW BACK PAIN WITHOUT SCIATICA: ICD-10-CM

## 2025-05-16 DIAGNOSIS — Z76.89 ENCOUNTER TO ESTABLISH CARE: Primary | ICD-10-CM

## 2025-05-16 DIAGNOSIS — M54.50 CHRONIC RIGHT-SIDED LOW BACK PAIN WITHOUT SCIATICA: ICD-10-CM

## 2025-05-16 RX ORDER — METHYLPREDNISOLONE 4 MG/1
TABLET ORAL
Qty: 21 TABLET | Refills: 0 | Status: SHIPPED | OUTPATIENT
Start: 2025-05-16

## 2025-05-16 NOTE — PROGRESS NOTES
"Chief Complaint  Chief Complaint   Patient presents with    Back Pain    Establish Care       Subjective        Aubrie Hilton is a 34 y.o. female who presents to [facility].  History of Present Illness  Presents today to establish care and has concerns for low back pain.    Medical conditions include:  Generalized epilepsy-lacosamide 200 mg BID, Lamictal 100 mg BID, Briviact 75 mg   Anxiety/stress- Sertraline 100 mg QD, Clonazepam 0.25 mg PRN  Chronic right sided low back pain- Flexeril 10 mg TID PRN, naproxen 375 mg BID PRN, Gabapentin 300 mg TID  Contraceptive- Nexplanon implant    Concerns for right low back pain for the last 3 years, worsened over the last 5 weeks.  Pain is sharp, stabbing, aching.  Pain radiates into right glute/hip.  Twisting to her right and extending right hip aggravates pain.   Bending over and laying down improves low back pain.   Realizes much of her back pain is most likely due to her weight as she has gained weight over the last few years.  Applies heat, ice, and stretching with mild relief.   Has been seeing her chiropractor.  Her neurologist ordered an MRI of the lumbar spine, scheduled for 5/20/2025.   Denies leg numbness/weakness/tingling, saddle anesthesia, or urinary/bowel incontinence.    She presented to the ER on 5/8/2025 due to low back pain that had worsened over the last 5 weeks.  She was given Toradol IM, discharged with naproxen and Flexeril.  Also recently went to [facility], received 2 steroid packs and finished both about a week ago.     Objective   /80 (BP Location: Left arm)   Pulse 88   Resp 20   Ht 172.7 cm (67.99\")   Wt 111 kg (244 lb)   SpO2 96%   BMI 37.11 kg/m²     Estimated body mass index is 37.11 kg/m² as calculated from the following:    Height as of this encounter: 172.7 cm (67.99\").    Weight as of this encounter: 111 kg (244 lb).     Physical Exam   GEN: In no acute distress, non toxic appearing  CV: Regular rate " and rhythm, no murmurs, 2+ peripheral pulses, No extremity edema.   RESP: Lungs clear to auscultation anteriorly and posteriorly in all lung fields bilaterally.  MSK: Mid and right low back are TTP.  Negative straight leg raise test bilaterally.    NEURO: AAO to person, place, and time. Strength 5/5 in BLE. Sensation to light touch intact in BLE.   PSYCH: Affect normal, insight fair         Result Review :              Assessment and Plan     Assessment & Plan  Encounter to establish care         Chronic right-sided low back pain without sciatica  Low-dose steroid pack has been prescribed for her to have on hand should she need it as she will be going to [place] soon and is concerned riding in the car for long hours may exacerbate low back pain.  Discussed risks associated with taking multiple steroid packs closely together, she understands.  Encouraged her to apply heat, stretch, gently massage, apply lidocaine patch, alternate Tylenol and ibuprofen as needed.  Encouraged her to have MRI performed.  Offered physical therapy referral, this has been made.  Orders:    Ambulatory Referral to Physical Therapy for Evaluation & Treatment    methylPREDNISolone (MEDROL) 4 MG dose pack; Take as directed on package instructions.    She is in agreement with this plan and will call should her symptoms worsen or not improve.       Follow Up     Return if symptoms worsen or fail to improve.

## 2025-05-20 ENCOUNTER — HOSPITAL ENCOUNTER (OUTPATIENT)
Dept: MRI IMAGING | Facility: HOSPITAL | Age: 35
Discharge: HOME | End: 2025-05-20
Admitting: NURSE PRACTITIONER
Payer: MEDICAID

## 2025-05-20 DIAGNOSIS — M54.50 RIGHT LUMBAR PAIN: ICD-10-CM

## 2025-05-20 PROCEDURE — 72148 MRI LUMBAR SPINE W/O DYE: CPT

## 2025-05-21 ENCOUNTER — RESULTS FOLLOW-UP (OUTPATIENT)
Dept: NEUROLOGY | Facility: CLINIC | Age: 35
End: 2025-05-21
Payer: MEDICAID

## 2025-05-21 DIAGNOSIS — M51.9 LUMBAR DISC DISEASE: Primary | ICD-10-CM

## 2025-05-21 NOTE — PROGRESS NOTES
Subjective   History of Present Illness: Aubrie Hilton is a 34 y.o. female is being seen for consultation today at the request of Nohelia Guillen, *right sided back pain.  Patient has had on and off symptoms with her back on the right side for 3 years where she bent down to pick something up.  Her symptoms usually quell down with chiropractic care and some medication that she experienced her last exacerbation about 6 weeks ago where she has pain that is mainly along the right side of her low back and into her hip and buttock region and at times into the groin with associated paresthesias down her leg.  This started after she had to do a lot of moving of heavy furniture and boxes in her apartment.  She describes no leg pain but has had that in the past.  She reports that her symptoms are worse when she is sitting in bed or bending forward and she finds herself not wanting to sit on that right side.  She starts physical therapy on the 16th.  She has never undergone any targeted injections for these flareups.  She describes no bowel or bladder dysfunction or saddle anesthesia.    History of Present Illness    The following portions of the patient's history were reviewed and updated as appropriate: allergies, current medications, past family history, past medical history, past social history, past surgical history, and problem list.    Review of Systems   Constitutional:  Positive for activity change.   HENT: Negative.     Eyes: Negative.    Respiratory: Negative.     Cardiovascular: Negative.    Gastrointestinal: Negative.    Endocrine: Negative.    Genitourinary: Negative.    Musculoskeletal:  Positive for arthralgias, back pain (right sided lower/groin) and myalgias.   Skin: Negative.    Allergic/Immunologic: Negative.    Neurological:         Tingling/right sided groin   Hematological: Negative.    Psychiatric/Behavioral:  Positive for sleep disturbance.    All other systems reviewed and are  "negative.      Objective     ./84 (BP Location: Left arm, Patient Position: Sitting)   Pulse 76   Ht 172.7 cm (67.99\")   Wt 108 kg (238 lb)   LMP  (LMP Unknown)   SpO2 98%   BMI 36.20 kg/m²    Body mass index is 36.2 kg/m².    Vitals:    05/23/25 1030   PainSc: 5           Physical Exam  Neurological Exam  Lower extremity motor strength 5/5  Positive Samuel finger    Assessment & Plan   Independent Review of Radiographic Studies:      I personally reviewed the images from the following studies.    MRI lumbar spine 5/20/2025    Moderate to large focal disc herniation at L5-S1 with abutment of the traversing S1 nerve roots right over left.  Moderate to severe left foraminal narrowing also presents.  Modic changes also noted.    Medical Decision Making:      Aubrie Cervantes a 34 y.o. female being seen today for acute exacerbation of some back and right buttock and hip pain with radiographic evidence of moderate to large focal disc herniation L5-S1 likely affecting the S1 nerve right over left.  No other imaging to review to suggest that this has been a longstanding herniation.  The only caveat to her symptoms not matching up with this disc herniation is her report of the groin pain and tenderness along the SI joint.  The treatment currently would be the same though and involve initiation of physical therapy which she starts on the 16th and placing her on a course of steroids to help with inflammation.  I also have ordered an WOODY L5-S1 see how she responds and if patient not responding favorably we could consider SI joint injection.  We will follow-up with her in 3 months to see how she is progressing.  Patient is agreeable to plan of care and wishes to proceed.  I encouraged to call the office any questions or concerns.      Diagnoses and all orders for this visit:    1. Lumbar disc herniation (Primary)  -     Epidural Block; Future    2. Sacroiliitis    Other orders  -     dexAMETHasone (DECADRON) 1.5 " MG tablet; Take 1 tablet by mouth Take As Directed. Day 1 take 3AM,3PM ,Day 2 take 3 AM,2 PM,Day 3 take 3 AM,2 PM,Day 4 take 2 AM,2 PM  ,Day 5 take 2 AM,2 PM,Day 6 take 2 AM,1 PM,Day 7 take 2 AM,1 PM, Day 8 take 1 AM,1 PM,Day 9 take 1 AM,1 PM,Day 10 take 1 AM  Dispense: 35 tablet; Refill: 0      Return in about 3 months (around 8/23/2025) for Recheck.    This patient was examined wearing appropriate personal protective equipment.     Aubrie Hilton  reports that she has never smoked. She has never used smokeless tobacco.  Body mass index is 36.2 kg/m².    Class 2 Severe Obesity (BMI >=35 and <=39.9). Obesity-related health conditions include the following: None. Obesity is unchanged. BMI is above average; BMI management plan is completed.  Recommendations for portion control and increasing exercise.     Patient's blood pressure was reviewed.  Recommendations for  a low-salt diet and exercise to maintain/improve BP in addition to taking any presribed medications.           Mayte Solano DNP, APRN  05/23/25  11:28 EDT

## 2025-05-21 NOTE — PROGRESS NOTES
Please call the patient regarding her abnormal result.  Tell the patient that she has a large disc protrusion at L5-S1 which is in her lower lumbar spine and I will refer her to neurosurgery.    MRI Lumbar Spine  Impression:  Large central disc protrusion at L5-S1 which abuts the bilateral descending S1 nerve roots, greater on the right. Moderate to severe left neural foraminal narrowing at this level.  Edematous degenerative endplate signal change at L5-S1 which can also be a source of pain.  Electronically Signed: Devyn Lazo MD    5/20/2025 9:09 PM EDT    Workstation ID: [id number]

## 2025-05-23 ENCOUNTER — TELEPHONE (OUTPATIENT)
Dept: PSYCHIATRY | Facility: CLINIC | Age: 35
End: 2025-05-23
Payer: MEDICAID

## 2025-05-23 ENCOUNTER — OFFICE VISIT (OUTPATIENT)
Dept: NEUROSURGERY | Facility: CLINIC | Age: 35
End: 2025-05-23
Payer: MEDICAID

## 2025-05-23 VITALS
WEIGHT: 238 LBS | OXYGEN SATURATION: 98 % | DIASTOLIC BLOOD PRESSURE: 84 MMHG | HEIGHT: 68 IN | SYSTOLIC BLOOD PRESSURE: 120 MMHG | HEART RATE: 76 BPM | BODY MASS INDEX: 36.07 KG/M2

## 2025-05-23 DIAGNOSIS — M51.26 LUMBAR DISC HERNIATION: Primary | ICD-10-CM

## 2025-05-23 DIAGNOSIS — M46.1 SACROILIITIS: ICD-10-CM

## 2025-05-23 RX ORDER — DEXAMETHASONE 1.5 MG/1
1.5 TABLET ORAL TAKE AS DIRECTED
Qty: 35 TABLET | Refills: 0 | Status: SHIPPED | OUTPATIENT
Start: 2025-05-23

## 2025-05-23 NOTE — TELEPHONE ENCOUNTER
Aubrie stopped by to ask for the increase in the Zoloft. She stated her Flexeril was kicking in at appointment. Her mom has since went to long-term and it is a lot for her figure things out with her belongings and animals.

## 2025-06-10 DIAGNOSIS — F41.1 GENERALIZED ANXIETY DISORDER WITH PANIC ATTACKS: ICD-10-CM

## 2025-06-10 DIAGNOSIS — F41.0 GENERALIZED ANXIETY DISORDER WITH PANIC ATTACKS: ICD-10-CM

## 2025-06-10 NOTE — TELEPHONE ENCOUNTER
Rx Refill Note  Requested Prescriptions     Pending Prescriptions Disp Refills    clonazePAM (KlonoPIN) 0.25 MG disintegrating tablet [Pharmacy Med Name: clonazePAM 0.25 MG Oral Tablet Disintegrating] 10 tablet 0     Sig: PLACE 1 TABLET ON THE TONGUE 1 TIME AS NEEDED FOR SEVERE ANXIETY (10 TABLETS MUST LAST 30 DAYS)        Last office visit with prescribing clinician: 5/9/2025     Next office visit with prescribing clinician: 8/19/2025     Office Visit with Patt Brandt, DNP, APRN (05/09/2025)     BEHAVIORAL HEALTH - SCAN - Inspect huyen, SCleve, 6/10/25 (06/10/2025)     Inspect Fill 5/8/25    Maira Young  06/10/25, 10:18 EDT

## 2025-06-11 RX ORDER — CLONAZEPAM 0.25 MG/1
TABLET, ORALLY DISINTEGRATING ORAL
Qty: 10 TABLET | Refills: 2 | Status: SHIPPED | OUTPATIENT
Start: 2025-06-11

## 2025-06-18 DIAGNOSIS — F41.1 GENERALIZED ANXIETY DISORDER WITH PANIC ATTACKS: ICD-10-CM

## 2025-06-18 DIAGNOSIS — F33.1 MDD (MAJOR DEPRESSIVE DISORDER), RECURRENT EPISODE, MODERATE: ICD-10-CM

## 2025-06-18 DIAGNOSIS — F41.0 GENERALIZED ANXIETY DISORDER WITH PANIC ATTACKS: ICD-10-CM

## 2025-06-18 RX ORDER — SERTRALINE HYDROCHLORIDE 100 MG/1
100 TABLET, FILM COATED ORAL DAILY
Qty: 30 TABLET | Refills: 0 | Status: SHIPPED | OUTPATIENT
Start: 2025-06-18 | End: 2025-06-20

## 2025-06-20 DIAGNOSIS — F41.1 GENERALIZED ANXIETY DISORDER WITH PANIC ATTACKS: ICD-10-CM

## 2025-06-20 DIAGNOSIS — F41.0 GENERALIZED ANXIETY DISORDER WITH PANIC ATTACKS: ICD-10-CM

## 2025-06-20 DIAGNOSIS — F33.1 MDD (MAJOR DEPRESSIVE DISORDER), RECURRENT EPISODE, MODERATE: ICD-10-CM

## 2025-06-20 RX ORDER — SERTRALINE HYDROCHLORIDE 100 MG/1
100 TABLET, FILM COATED ORAL DAILY
Qty: 30 TABLET | Refills: 0 | Status: SHIPPED | OUTPATIENT
Start: 2025-06-20

## 2025-06-20 RX ORDER — DEXAMETHASONE 1.5 MG/1
TABLET ORAL
Qty: 35 TABLET | Refills: 0 | OUTPATIENT
Start: 2025-06-20

## 2025-07-10 RX ORDER — DEXAMETHASONE 1.5 MG/1
TABLET ORAL
Qty: 35 TABLET | Refills: 0 | OUTPATIENT
Start: 2025-07-10

## 2025-08-11 ENCOUNTER — TELEPHONE (OUTPATIENT)
Dept: PAIN MEDICINE | Facility: CLINIC | Age: 35
End: 2025-08-11
Payer: OTHER GOVERNMENT

## 2025-08-11 ENCOUNTER — TELEPHONE (OUTPATIENT)
Dept: NEUROSURGERY | Facility: CLINIC | Age: 35
End: 2025-08-11
Payer: OTHER GOVERNMENT

## 2025-08-20 ENCOUNTER — TELEPHONE (OUTPATIENT)
Dept: BARIATRICS/WEIGHT MGMT | Facility: CLINIC | Age: 35
End: 2025-08-20
Payer: OTHER GOVERNMENT

## 2025-08-22 DIAGNOSIS — F41.1 GENERALIZED ANXIETY DISORDER WITH PANIC ATTACKS: ICD-10-CM

## 2025-08-22 DIAGNOSIS — F41.0 GENERALIZED ANXIETY DISORDER WITH PANIC ATTACKS: ICD-10-CM

## 2025-08-22 DIAGNOSIS — F33.1 MDD (MAJOR DEPRESSIVE DISORDER), RECURRENT EPISODE, MODERATE: ICD-10-CM

## 2025-08-25 RX ORDER — SERTRALINE HYDROCHLORIDE 100 MG/1
100 TABLET, FILM COATED ORAL DAILY
Qty: 30 TABLET | Refills: 0 | Status: SHIPPED | OUTPATIENT
Start: 2025-08-25

## 2025-08-27 ENCOUNTER — HOSPITAL ENCOUNTER (OUTPATIENT)
Dept: PAIN MEDICINE | Facility: HOSPITAL | Age: 35
Discharge: HOME | End: 2025-08-27
Payer: OTHER GOVERNMENT

## 2025-08-27 VITALS
RESPIRATION RATE: 16 BRPM | BODY MASS INDEX: 36.07 KG/M2 | SYSTOLIC BLOOD PRESSURE: 141 MMHG | WEIGHT: 238 LBS | OXYGEN SATURATION: 99 % | HEART RATE: 70 BPM | HEIGHT: 68 IN | DIASTOLIC BLOOD PRESSURE: 97 MMHG | TEMPERATURE: 96.9 F

## 2025-08-27 DIAGNOSIS — R52 PAIN: ICD-10-CM

## 2025-08-27 DIAGNOSIS — M54.16 LUMBAR RADICULITIS: Primary | ICD-10-CM

## 2025-08-27 DIAGNOSIS — G47.10 HYPERSOMNIA: Primary | ICD-10-CM

## 2025-08-27 PROCEDURE — 25510000001 IOPAMIDOL 41 % SOLUTION: Performed by: ANESTHESIOLOGY

## 2025-08-27 PROCEDURE — 25010000002 METHYLPREDNISOLONE PER 40 MG: Performed by: ANESTHESIOLOGY

## 2025-08-27 PROCEDURE — 77003 FLUOROGUIDE FOR SPINE INJECT: CPT

## 2025-08-27 RX ORDER — METHYLPREDNISOLONE ACETATE 40 MG/ML
40 INJECTION, SUSPENSION INTRA-ARTICULAR; INTRALESIONAL; INTRAMUSCULAR; SOFT TISSUE ONCE
Status: COMPLETED | OUTPATIENT
Start: 2025-08-27 | End: 2025-08-27

## 2025-08-27 RX ORDER — IOPAMIDOL 408 MG/ML
3 INJECTION, SOLUTION INTRATHECAL
Status: COMPLETED | OUTPATIENT
Start: 2025-08-27 | End: 2025-08-27

## 2025-08-27 RX ADMIN — METHYLPREDNISOLONE ACETATE 40 MG: 40 INJECTION, SUSPENSION INTRA-ARTICULAR; INTRALESIONAL; INTRAMUSCULAR; INTRASYNOVIAL; SOFT TISSUE at 15:34

## 2025-08-27 RX ADMIN — IOPAMIDOL 3 ML: 408 INJECTION, SOLUTION INTRATHECAL at 15:33

## 2025-08-28 ENCOUNTER — TELEPHONE (OUTPATIENT)
Dept: PAIN MEDICINE | Facility: HOSPITAL | Age: 35
End: 2025-08-28
Payer: OTHER GOVERNMENT